# Patient Record
Sex: MALE | Race: WHITE | Employment: FULL TIME | URBAN - NONMETROPOLITAN AREA
[De-identification: names, ages, dates, MRNs, and addresses within clinical notes are randomized per-mention and may not be internally consistent; named-entity substitution may affect disease eponyms.]

---

## 2017-02-17 ENCOUNTER — TELEPHONE (OUTPATIENT)
Dept: UROLOGY | Age: 37
End: 2017-02-17

## 2017-02-17 ENCOUNTER — OFFICE VISIT (OUTPATIENT)
Dept: UROLOGY | Age: 37
End: 2017-02-17

## 2017-02-17 VITALS
HEIGHT: 74 IN | SYSTOLIC BLOOD PRESSURE: 138 MMHG | DIASTOLIC BLOOD PRESSURE: 78 MMHG | WEIGHT: 315 LBS | BODY MASS INDEX: 40.43 KG/M2

## 2017-02-17 DIAGNOSIS — Z99.89 OBSTRUCTIVE SLEEP APNEA ON CPAP: ICD-10-CM

## 2017-02-17 DIAGNOSIS — I10 ESSENTIAL HYPERTENSION: ICD-10-CM

## 2017-02-17 DIAGNOSIS — Z01.818 PRE-OP TESTING: ICD-10-CM

## 2017-02-17 DIAGNOSIS — G47.33 OBSTRUCTIVE SLEEP APNEA ON CPAP: ICD-10-CM

## 2017-02-17 DIAGNOSIS — N50.89 TESTICULAR MASS: Primary | ICD-10-CM

## 2017-02-17 PROCEDURE — 99214 OFFICE O/P EST MOD 30 MIN: CPT | Performed by: UROLOGY

## 2017-02-21 ENCOUNTER — TELEPHONE (OUTPATIENT)
Dept: UROLOGY | Age: 37
End: 2017-02-21

## 2017-02-22 ENCOUNTER — TELEPHONE (OUTPATIENT)
Dept: UROLOGY | Age: 37
End: 2017-02-22

## 2017-02-23 LAB
BASOPHILS ABSOLUTE: NORMAL /ΜL
BASOPHILS RELATIVE PERCENT: 0.1 %
BUN BLDV-MCNC: 18 MG/DL
CALCIUM SERPL-MCNC: 9.5 MG/DL
CHLORIDE BLD-SCNC: 105 MMOL/L
CO2: 30 MMOL/L
CREAT SERPL-MCNC: 1 MG/DL
EOSINOPHILS ABSOLUTE: NORMAL /ΜL
EOSINOPHILS RELATIVE PERCENT: 1.9 %
GFR CALCULATED: NORMAL
GLUCOSE BLD-MCNC: 97 MG/DL
HCT VFR BLD CALC: 49.8 % (ref 41–53)
HEMOGLOBIN: 16.7 G/DL (ref 13.5–17.5)
LYMPHOCYTES ABSOLUTE: NORMAL /ΜL
LYMPHOCYTES RELATIVE PERCENT: 34.5 %
MCH RBC QN AUTO: 29.1 PG
MCHC RBC AUTO-ENTMCNC: 33.5 G/DL
MCV RBC AUTO: 86.9 FL
MONOCYTES ABSOLUTE: NORMAL /ΜL
MONOCYTES RELATIVE PERCENT: 8.2 %
NEUTROPHILS ABSOLUTE: NORMAL /ΜL
NEUTROPHILS RELATIVE PERCENT: 54.1 %
PDW BLD-RTO: 12.7 %
PLATELET # BLD: 198 K/ΜL
PMV BLD AUTO: NORMAL FL
POTASSIUM SERPL-SCNC: 4.4 MMOL/L
RBC # BLD: 5.73 10^6/ΜL
SODIUM BLD-SCNC: 142 MMOL/L
WBC # BLD: 7.2 10^3/ML

## 2017-03-02 ENCOUNTER — TELEPHONE (OUTPATIENT)
Dept: UROLOGY | Age: 37
End: 2017-03-02

## 2017-03-14 ENCOUNTER — OFFICE VISIT (OUTPATIENT)
Dept: UROLOGY | Age: 37
End: 2017-03-14

## 2017-03-14 VITALS
HEIGHT: 74 IN | SYSTOLIC BLOOD PRESSURE: 136 MMHG | BODY MASS INDEX: 40.43 KG/M2 | WEIGHT: 315 LBS | DIASTOLIC BLOOD PRESSURE: 80 MMHG

## 2017-03-14 DIAGNOSIS — N50.89 MASS OF RIGHT TESTIS: Primary | ICD-10-CM

## 2017-03-14 LAB
BILIRUBIN URINE: NEGATIVE
BLOOD URINE, POC: NORMAL
CHARACTER, URINE: CLEAR
COLOR, URINE: YELLOW
GLUCOSE URINE: NEGATIVE MG/DL
KETONES, URINE: NEGATIVE
LEUKOCYTE CLUMPS, URINE: NEGATIVE
NITRITE, URINE: NEGATIVE
PH, URINE: 5.5
PROTEIN, URINE: NEGATIVE MG/DL
SPECIFIC GRAVITY, URINE: 1.02 (ref 1–1.03)
UROBILINOGEN, URINE: 0.2 EU/DL

## 2017-03-14 PROCEDURE — 99024 POSTOP FOLLOW-UP VISIT: CPT | Performed by: UROLOGY

## 2017-03-14 PROCEDURE — 81003 URINALYSIS AUTO W/O SCOPE: CPT | Performed by: UROLOGY

## 2017-06-09 ENCOUNTER — OFFICE VISIT (OUTPATIENT)
Dept: PULMONOLOGY | Age: 37
End: 2017-06-09

## 2017-06-09 VITALS
OXYGEN SATURATION: 97 % | SYSTOLIC BLOOD PRESSURE: 110 MMHG | WEIGHT: 315 LBS | DIASTOLIC BLOOD PRESSURE: 74 MMHG | HEART RATE: 58 BPM | HEIGHT: 74 IN | BODY MASS INDEX: 40.43 KG/M2

## 2017-06-09 DIAGNOSIS — Z99.89 OSA ON CPAP: Primary | ICD-10-CM

## 2017-06-09 DIAGNOSIS — G47.33 OSA ON CPAP: Primary | ICD-10-CM

## 2017-06-09 PROCEDURE — 99213 OFFICE O/P EST LOW 20 MIN: CPT | Performed by: INTERNAL MEDICINE

## 2018-02-16 ENCOUNTER — HOSPITAL ENCOUNTER (OUTPATIENT)
Dept: NON INVASIVE DIAGNOSTICS | Age: 38
Discharge: HOME | End: 2018-02-16
Payer: COMMERCIAL

## 2018-02-16 VITALS — WEIGHT: 315 LBS | HEIGHT: 74 IN | BODY MASS INDEX: 40.43 KG/M2

## 2018-02-16 PROCEDURE — 3430000000 HC RX DIAGNOSTIC RADIOPHARMACEUTICAL: Performed by: FAMILY MEDICINE

## 2018-02-16 PROCEDURE — 93017 CV STRESS TEST TRACING ONLY: CPT | Performed by: NUCLEAR MEDICINE

## 2018-02-16 PROCEDURE — 78452 HT MUSCLE IMAGE SPECT MULT: CPT

## 2018-02-16 PROCEDURE — A9500 TC99M SESTAMIBI: HCPCS | Performed by: FAMILY MEDICINE

## 2018-02-16 PROCEDURE — 6360000002 HC RX W HCPCS

## 2018-02-16 RX ADMIN — Medication 33 MILLICURIE: at 13:45

## 2018-02-16 RX ADMIN — Medication 9.3 MILLICURIE: at 12:38

## 2018-03-16 ENCOUNTER — OFFICE VISIT (OUTPATIENT)
Dept: UROLOGY | Age: 38
End: 2018-03-16
Payer: COMMERCIAL

## 2018-03-16 VITALS
BODY MASS INDEX: 40.43 KG/M2 | DIASTOLIC BLOOD PRESSURE: 82 MMHG | WEIGHT: 315 LBS | HEIGHT: 74 IN | SYSTOLIC BLOOD PRESSURE: 144 MMHG

## 2018-03-16 DIAGNOSIS — Z90.79 H/O UNILATERAL ORCHIECTOMY: Primary | ICD-10-CM

## 2018-03-16 DIAGNOSIS — N52.9 ERECTILE DYSFUNCTION, UNSPECIFIED ERECTILE DYSFUNCTION TYPE: ICD-10-CM

## 2018-03-16 LAB
BILIRUBIN URINE: NEGATIVE
BLOOD URINE, POC: NORMAL
CHARACTER, URINE: CLEAR
COLOR, URINE: YELLOW
GLUCOSE URINE: NEGATIVE MG/DL
KETONES, URINE: NEGATIVE
LEUKOCYTE CLUMPS, URINE: NEGATIVE
NITRITE, URINE: NEGATIVE
PH, URINE: 5.5
PROTEIN, URINE: NEGATIVE MG/DL
SPECIFIC GRAVITY, URINE: 1.02 (ref 1–1.03)
UROBILINOGEN, URINE: 1 EU/DL

## 2018-03-16 PROCEDURE — G8484 FLU IMMUNIZE NO ADMIN: HCPCS | Performed by: NURSE PRACTITIONER

## 2018-03-16 PROCEDURE — G8427 DOCREV CUR MEDS BY ELIG CLIN: HCPCS | Performed by: NURSE PRACTITIONER

## 2018-03-16 PROCEDURE — 99213 OFFICE O/P EST LOW 20 MIN: CPT | Performed by: NURSE PRACTITIONER

## 2018-03-16 PROCEDURE — 1036F TOBACCO NON-USER: CPT | Performed by: NURSE PRACTITIONER

## 2018-03-16 PROCEDURE — 81003 URINALYSIS AUTO W/O SCOPE: CPT | Performed by: NURSE PRACTITIONER

## 2018-03-16 PROCEDURE — G8417 CALC BMI ABV UP PARAM F/U: HCPCS | Performed by: NURSE PRACTITIONER

## 2018-03-16 NOTE — PROGRESS NOTES
Weight: (!) 405 lb 9.6 oz (184 kg)    Height: 6' 2\" (1.88 m)        Constitutional: Alert and oriented times 3, no acute distress and cooperative to examination with appropriate mood and affect. HENT:   Head:        Normocephalic and atraumatic. Mouth/Throat:         Mucous membranes are normal.   Eyes:         EOM are normal. No scleral icterus. PERRLA. Neck:        Supple, symmetrical, trachea midline  Pulmonary/Chest:      Chest symmetric with normal A/P diameter. Normal respiratory rate and rhthym. No use of accessory muscles. Abdominal:         Soft. No tenderness. Bowel sounds present. Obese  Musculoskeletal:         Normal range of motion. No edema or tenderness of lower extremities. Extremities: No cyanosis, clubbing, or edema present. Neurological:        Alert and oriented. No cranial nerve deficit. Benjamen Lies Psychiatric:        Normal mood and affect. Genitalia: Uncircumcised penis, urethral meatus in normal location, foreskin easily retracts over glands. Surgically absent right testicle, left testicle appears benign.       Labs   Urine dip demonstrates   Results for POC orders placed in visit on 03/16/18   POCT Urinalysis No Micro (Auto)   Result Value Ref Range    Glucose, Ur Negative NEGATIVE mg/dl    Bilirubin Urine Negative     Ketones, Urine Negative NEGATIVE    Specific Gravity, Urine 1.025 1.002 - 1.03    Blood, UA POC Trace-intact NEGATIVE    pH, Urine 5.50 5.0 - 9.0    Protein, Urine Negative NEGATIVE mg/dl    Urobilinogen, Urine 1.00 0.0 - 1.0 eu/dl    Nitrite, Urine Negative NEGATIVE    Leukocyte Clumps, Urine Negative NEGATIVE    Color, Urine Yellow YELLOW-STR    Character, Urine Clear [id number]       Patients recent PSA values are as follows  No results found for: PSA, PSADIA     Recent BUN/Creatinine:  Lab Results   Component Value Date    BUN 18 02/23/2017    CREATININE 1.0 02/23/2017       Radiology  No recent imaging       Assessment & Plan:     Hx of right orchiectomy Erectile Dysfunction    Kashif Todd follows up history of right orchiectomy. Scrotal exam is benign on exam. Takes Viagra 50 mg PRN for ED, obtaining medication with PCP. Advised weight loss, staying physically active and eating healthy to help prevent/improve ED function. He will follow up with us PRN in the future. Return if symptoms worsen or fail to improve.     ALEXIA Schneider  Urology

## 2018-07-20 ENCOUNTER — OFFICE VISIT (OUTPATIENT)
Dept: PULMONOLOGY | Age: 38
End: 2018-07-20
Payer: COMMERCIAL

## 2018-07-20 VITALS
HEIGHT: 74 IN | HEART RATE: 86 BPM | DIASTOLIC BLOOD PRESSURE: 86 MMHG | WEIGHT: 315 LBS | OXYGEN SATURATION: 95 % | SYSTOLIC BLOOD PRESSURE: 134 MMHG | BODY MASS INDEX: 40.43 KG/M2 | RESPIRATION RATE: 18 BRPM

## 2018-07-20 DIAGNOSIS — G47.33 OBSTRUCTIVE SLEEP APNEA ON CPAP: Primary | ICD-10-CM

## 2018-07-20 DIAGNOSIS — Z99.89 OBSTRUCTIVE SLEEP APNEA ON CPAP: Primary | ICD-10-CM

## 2018-07-20 PROCEDURE — 1036F TOBACCO NON-USER: CPT | Performed by: PHYSICIAN ASSISTANT

## 2018-07-20 PROCEDURE — G8417 CALC BMI ABV UP PARAM F/U: HCPCS | Performed by: PHYSICIAN ASSISTANT

## 2018-07-20 PROCEDURE — G8427 DOCREV CUR MEDS BY ELIG CLIN: HCPCS | Performed by: PHYSICIAN ASSISTANT

## 2018-07-20 PROCEDURE — 99213 OFFICE O/P EST LOW 20 MIN: CPT | Performed by: PHYSICIAN ASSISTANT

## 2018-07-20 ASSESSMENT — ENCOUNTER SYMPTOMS
RESPIRATORY NEGATIVE: 1
EYES NEGATIVE: 1
NAUSEA: 0
SORE THROAT: 0
ORTHOPNEA: 0
SHORTNESS OF BREATH: 0
SPUTUM PRODUCTION: 0
HEARTBURN: 0
SINUS PAIN: 0
COUGH: 0
WHEEZING: 0
GASTROINTESTINAL NEGATIVE: 1

## 2018-07-20 NOTE — PROGRESS NOTES
[facility]      Earline Andrade                                         [id number]  7/20/2018   Chief Complaint   Patient presents with    Sleep Apnea     ERICH 1 yr f/u with download        Pt of Dr. Brinda Jaramillo    PAP Download:   Original or initial  AHI: 89.0    Date of initial study: 5-27-10    [x] Compliant  96.7%   []  Noncompliant 3.3 %     PAP Type C PAP   Level  11.0 cmH2O  Avg Hrs/Day 6 hrs 46 min 45 sec  AHI: 0.7   Recorded compliance dates , 6-20-18  to 7-19-18  Machine/Mfg: Respironics Interface: Nasal pillows    Provider:  [x][facility]  [][facility]  [][facility]  [][facility]         [][facility] []Other:   Neck Size:  22 in  Mallampati: IV  ESS:  1  Here is a scan of the most recent download:              Presentation:   Lloyd Lyn presents for sleep medicine follow up for obstructive sleep apnea  Since the last visit, Lloyd Lyn is doing reasonably well with their sleep machine. Other comments: He is doing well with PAP. Equipment issues: The pressure is  acceptable, the mask is acceptable and he  is  using the humidity. Sleep issues:  Do you feel better? Yes  More rested? Yes   Better concentration? yes    Progress History:   Since last visit any new medical issues? No  New ER or hospitlal visits? No  Any new or changes in medicines? No  Any new sleep medicines?  No        Past Medical History:   Diagnosis Date    Chronic gout 2004    Hypertension     Sleep apnea 2011    has CPAP       Past Surgical History:   Procedure Laterality Date    APPENDECTOMY  2001    TESTICLE REMOVAL Right 03/06/2017    Dr. Sandra Maria       Social History   Substance Use Topics    Smoking status: Former Smoker     Quit date: 2002    Smokeless tobacco: Never Used    Alcohol use 0.0 oz/week      Comment: social       No Known Allergies    Current Outpatient Prescriptions   Medication Sig Dispense Refill    allopurinol (ZYLOPRIM) 300 MG tablet Take 300 mg by mouth 2 times daily       motion. Neck supple. Cardiovascular: Normal rate, regular rhythm and normal heart sounds. Pulmonary/Chest: Effort normal and breath sounds normal. No stridor. No respiratory distress. Abdominal: Soft. Bowel sounds are normal.   Musculoskeletal: Normal range of motion. He exhibits no edema. Neurological: He is alert and oriented to person, place, and time. Gait normal.   Skin: Skin is warm and dry. He is not diaphoretic. Psychiatric: Mood, memory, affect and judgment normal.         ASSESSMENT/DIAGNOSIS     Diagnosis Orders   1. Obstructive sleep apnea on CPAP              Plan   Do you need any equipment today? Yes update supplies    - He  was advised to continue current positive airway pressure therapy with above described pressure. - He  advised to keep good compliance with current recommended pressure to get optimal results and clinical improvement  - Recommend 7-9 hours of sleep with PAP  - He was advised to call [facility] regarding supplies if needed. - He call my office for earlier appointment if needed for worsening of sleep symptoms.   - He was instructed on weight loss  - Darylene Rancher was educated about my impression and plan. Patient verbalizes understanding.   We will see Elias Rey back in: 1 year with download    Agustin Garcia PA-C, MPAS  7/20/2018

## 2019-07-22 ENCOUNTER — OFFICE VISIT (OUTPATIENT)
Dept: PULMONOLOGY | Age: 39
End: 2019-07-22
Payer: COMMERCIAL

## 2019-07-22 VITALS
SYSTOLIC BLOOD PRESSURE: 124 MMHG | DIASTOLIC BLOOD PRESSURE: 70 MMHG | HEART RATE: 58 BPM | BODY MASS INDEX: 40.43 KG/M2 | HEIGHT: 74 IN | OXYGEN SATURATION: 98 % | WEIGHT: 315 LBS

## 2019-07-22 DIAGNOSIS — Z99.89 OBSTRUCTIVE SLEEP APNEA ON CPAP: Primary | ICD-10-CM

## 2019-07-22 DIAGNOSIS — G47.33 OBSTRUCTIVE SLEEP APNEA ON CPAP: Primary | ICD-10-CM

## 2019-07-22 DIAGNOSIS — E66.01 OBESITY, CLASS III, BMI 40-49.9 (MORBID OBESITY) (HCC): ICD-10-CM

## 2019-07-22 PROCEDURE — G8427 DOCREV CUR MEDS BY ELIG CLIN: HCPCS | Performed by: PHYSICIAN ASSISTANT

## 2019-07-22 PROCEDURE — G8417 CALC BMI ABV UP PARAM F/U: HCPCS | Performed by: PHYSICIAN ASSISTANT

## 2019-07-22 PROCEDURE — 1036F TOBACCO NON-USER: CPT | Performed by: PHYSICIAN ASSISTANT

## 2019-07-22 PROCEDURE — 99213 OFFICE O/P EST LOW 20 MIN: CPT | Performed by: PHYSICIAN ASSISTANT

## 2019-07-22 ASSESSMENT — ENCOUNTER SYMPTOMS
BACK PAIN: 0
CHEST TIGHTNESS: 0
SHORTNESS OF BREATH: 0
EYES NEGATIVE: 1
NAUSEA: 0
ALLERGIC/IMMUNOLOGIC NEGATIVE: 1
DIARRHEA: 0
WHEEZING: 0
COUGH: 0
STRIDOR: 0

## 2020-06-15 ENCOUNTER — OFFICE VISIT (OUTPATIENT)
Dept: UROLOGY | Age: 40
End: 2020-06-15
Payer: COMMERCIAL

## 2020-06-15 VITALS — WEIGHT: 315 LBS | HEIGHT: 74 IN | BODY MASS INDEX: 40.43 KG/M2 | TEMPERATURE: 98.6 F

## 2020-06-15 LAB
BILIRUBIN URINE: NEGATIVE
BLOOD URINE, POC: ABNORMAL
CHARACTER, URINE: CLEAR
COLOR, URINE: YELLOW
GLUCOSE URINE: NEGATIVE MG/DL
KETONES, URINE: NEGATIVE
LEUKOCYTE CLUMPS, URINE: NEGATIVE
NITRITE, URINE: NEGATIVE
PH, URINE: 5.5 (ref 5–9)
PROTEIN, URINE: NEGATIVE MG/DL
SPECIFIC GRAVITY, URINE: 1.02 (ref 1–1.03)
UROBILINOGEN, URINE: 0.2 EU/DL (ref 0–1)

## 2020-06-15 PROCEDURE — G8427 DOCREV CUR MEDS BY ELIG CLIN: HCPCS | Performed by: UROLOGY

## 2020-06-15 PROCEDURE — G8417 CALC BMI ABV UP PARAM F/U: HCPCS | Performed by: UROLOGY

## 2020-06-15 PROCEDURE — 1036F TOBACCO NON-USER: CPT | Performed by: UROLOGY

## 2020-06-15 PROCEDURE — 99204 OFFICE O/P NEW MOD 45 MIN: CPT | Performed by: UROLOGY

## 2020-06-15 PROCEDURE — 81003 URINALYSIS AUTO W/O SCOPE: CPT | Performed by: UROLOGY

## 2020-06-15 RX ORDER — TADALAFIL 20 MG/1
20 TABLET ORAL PRN
Qty: 10 TABLET | Refills: 11 | Status: SHIPPED | OUTPATIENT
Start: 2020-06-15 | End: 2020-09-14

## 2020-06-15 NOTE — PROGRESS NOTES
patric   Urology Clinic Consultation / New Patient Visit      Patient:  Patricia Hayden  YOB: 1980  Date: 6/15/2020    HISTORY OF PRESENT ILLNESS:   The patient is a 35 y.o. male who presents today for evaluation of the following problem(s):      1. Scrotal pain    2. Epididymoorchitis    3. Erectile dysfunction due to arterial insufficiency    4. Low libido         C/o scrotal pain and erectile dysfunction. Overall the problem(s) : are worsening. Associated Symptoms: No dysuria, gross hematuria. Pain Severity:   3/10    Summary of old records: Testicle removal, right, 2017. No hx of hernias, kidney stones. Is a . (Patient's old records, notes and chart reviewed and summarized above.)      Onset 1 week ago  Severity is described as mild-moderate. The course of symptoms over time is resolved and intermittent. Alleviating factors: none  Worsening factors: none  Lower urinary tract symptoms: None  No fevers, chills, n/v, abdominal pain, diarrhea, constipation  Pinching discomfort, intermittent  Resolved  No heamturia    ED: present for about 5 years, worse in the last year. No interest, nocturnal erections. No recent stressors in life. No sexual activity x 1 year. Difficulty with maintaining erection, no issues with getting erection. Has tried viagra, has had times where it didn't work at all for him. Intermittent use.  Testosterone 12/3/2019, 3.84, normal.          Last several PSA's:  No results found for: PSA    Last total testosterone:  Lab Results   Component Value Date    TESTOSTERONE 3.84 12/03/2019       Urinalysis today:  Results for POC orders placed in visit on 06/15/20   POCT Urinalysis No Micro (Auto)   Result Value Ref Range    Glucose, Ur Negative NEGATIVE mg/dl    Bilirubin Urine Negative     Ketones, Urine Negative NEGATIVE    Specific Gravity, Urine 1.020 1.002 - 1.03    Blood, UA POC Small (A) NEGATIVE    pH, Urine 5.50 5.0 - 9.0    Protein, Urine Negative NEGATIVE negative except for what is in HPI  Skin: negative   Neurological: negative  Hematological/Lymphatic: negative  Psychological: negative    Physical Exam:    This a 35 y.o. male   Vitals:    06/15/20 1209   Temp: 98.6 °F (37 °C)     Constitutional: Patient in no acute distress; Neuro: alert and oriented to person place and time. Psych: Mood and affect normal.  Skin: Normal  Lungs: Respiratory effort normal  Cardiovascular:  Normal peripheral pulses  Abdomen: Soft, non-tender, non-distended   Bladder non-tender and not distended. Lymphatics: no palpable lymphadenopathy  Penis normal and circumcised  Urethral meatus normal  Scrotal exam normal  Right testicle absent  Left testicle normal no masses  No tenderness    Assessment and Plan      1. Scrotal pain    2. Epididymoorchitis    3. Erectile dysfunction due to arterial insufficiency    4. Low libido           Plan:         ED: tried Sildenafil in the past, 50 mg. Sometimes works or sometimes not. Recheck Labs (free and total T)  Will try Cialis 20mg PRN  Good Rx coupon  Patient told to take  least 1 hour prior to sexual intercourse. He was told he cannot take this medication with nitrates such as nitroglycerin. The patient was told Viagra will help him for 4-6 hours. EO-itis: resolved. Discussed avoiding triggers. Return in about 6 weeks (around 7/27/2020). Checo Hampton MD  [facility] U[facility]    I have discussed the care of this patient including pertinent history and exam findings, with the resident. I have seen and examined the patient and the key elements of all parts of the encounter have been performed by me. I agree with the assessment, plan and orders as documented by the resident.     Lori Hernandez M.D, MD

## 2020-07-20 ENCOUNTER — NURSE ONLY (OUTPATIENT)
Dept: LAB | Age: 40
End: 2020-07-20

## 2020-07-22 LAB — TESTOSTERONE FREE: NORMAL

## 2020-08-10 ENCOUNTER — OFFICE VISIT (OUTPATIENT)
Dept: UROLOGY | Age: 40
End: 2020-08-10
Payer: COMMERCIAL

## 2020-08-10 ENCOUNTER — TELEPHONE (OUTPATIENT)
Dept: UROLOGY | Age: 40
End: 2020-08-10

## 2020-08-10 VITALS — BODY MASS INDEX: 40.43 KG/M2 | HEIGHT: 74 IN | TEMPERATURE: 94.6 F | WEIGHT: 315 LBS

## 2020-08-10 LAB
BILIRUBIN URINE: NEGATIVE
BLOOD URINE, POC: NORMAL
CHARACTER, URINE: CLEAR
COLOR, URINE: YELLOW
GLUCOSE URINE: NEGATIVE MG/DL
KETONES, URINE: NEGATIVE
LEUKOCYTE CLUMPS, URINE: NEGATIVE
NITRITE, URINE: NEGATIVE
PH, URINE: 5.5 (ref 5–9)
PROTEIN, URINE: NEGATIVE MG/DL
SPECIFIC GRAVITY, URINE: 1.02 (ref 1–1.03)
UROBILINOGEN, URINE: 0.2 EU/DL (ref 0–1)

## 2020-08-10 PROCEDURE — G8417 CALC BMI ABV UP PARAM F/U: HCPCS | Performed by: UROLOGY

## 2020-08-10 PROCEDURE — 1036F TOBACCO NON-USER: CPT | Performed by: UROLOGY

## 2020-08-10 PROCEDURE — 81003 URINALYSIS AUTO W/O SCOPE: CPT | Performed by: UROLOGY

## 2020-08-10 PROCEDURE — G8427 DOCREV CUR MEDS BY ELIG CLIN: HCPCS | Performed by: UROLOGY

## 2020-08-10 PROCEDURE — 99214 OFFICE O/P EST MOD 30 MIN: CPT | Performed by: UROLOGY

## 2020-09-14 ENCOUNTER — OFFICE VISIT (OUTPATIENT)
Dept: UROLOGY | Age: 40
End: 2020-09-14
Payer: COMMERCIAL

## 2020-09-14 ENCOUNTER — TELEPHONE (OUTPATIENT)
Dept: UROLOGY | Age: 40
End: 2020-09-14

## 2020-09-14 VITALS — TEMPERATURE: 97.7 F | WEIGHT: 315 LBS | BODY MASS INDEX: 40.43 KG/M2 | HEIGHT: 74 IN

## 2020-09-14 PROCEDURE — G8417 CALC BMI ABV UP PARAM F/U: HCPCS | Performed by: UROLOGY

## 2020-09-14 PROCEDURE — 99214 OFFICE O/P EST MOD 30 MIN: CPT | Performed by: UROLOGY

## 2020-09-14 PROCEDURE — G8427 DOCREV CUR MEDS BY ELIG CLIN: HCPCS | Performed by: UROLOGY

## 2020-09-14 PROCEDURE — 1036F TOBACCO NON-USER: CPT | Performed by: UROLOGY

## 2020-09-14 NOTE — PROGRESS NOTES
Patient is having a pinching feeling on right side of scrotum( side testicle was removed from 4 years ago). Noticing almost like a \" fluid filled cyst\" per patient and wife.

## 2020-09-14 NOTE — TELEPHONE ENCOUNTER
Patient to be scheduled for surgery with Dr Joao Eduardo. Surgery consent on arrival. Patient will need pre op fasting labs,urinalysis and Covid 19 prior. Surgery instructions to be mailed to the patient.

## 2020-09-14 NOTE — PROGRESS NOTES
Dr. Nicolasa Crowley MD  [facility] Urology Clinic Consultation / New Patient Visit    Patient:  Cris Chandler  YOB: 1980  Date: 9/14/2020  Consult requested from Jamshid Bowman MD     HISTORY OF PRESENT ILLNESS:   The patient is a 35 y.o. male who presents today for follow-up for the following problem(s): ED, low libido  Overall the problem(s) : are worsening. Associated Symptoms: No dysuria, gross hematuria. Pain Severity:      Today visit:   9/14/2020  Today we perform trimix teaching. He is here with his wife. We explain anatomy and risks (including risk of priapism), benefits and alternative procedures are explained, informed consent is obtained, and the patient elects to proceed. At the 3 O'clock position we inject 0.1ml of solution into the corporeal body. Pressure was held, there was no bruising or bleeding. He also has a small scrotal cyst in the upper right scrotum. There is a lesion associated with it. There is purulent drainage, but no obvious signs of infection. 8/10/20   Thanh Culver presents with us today for follow up for ED. He has tried viagra in the past, and recently was switched to Cialis. He can achieve an erection, but the quality is no sufficient for intercourse. He states he and his wife are getting frusterated. Would like to try another modality. We discuss next option, and he would like to try ICI. Discussed risks and benefits, including risk of priapism. Testosterone levels are reviewed and wnl. Dicussed weight loss. Path: Right orchiectomy  FINAL DIAGNOSIS:   Right testis with spermatic cord, orchiectomy:    Benign epithelial lined cysts consistent with epididymal origin.    Benign testicular parenchyma and unremarkable spermatic cord.    Negative for malignancy.      Summary of old records:   (Patient's old records, notes and chart reviewed and summarized above.)    Last several PSA's:  No results found for: PSA    Last total testosterone:  Lab Results   Component Value Date    TESTOSTERONE 3.84 2019       Urinalysis today:  No results found for this visit on 20. Last BUN and creatinine:  Lab Results   Component Value Date    BUN 14 2019     Lab Results   Component Value Date    CREATININE 0.95 2019       Imaging Reviewed during this Office Visit:   (results were independently reviewed by physician and radiology report verified)    PAST MEDICAL, FAMILY AND SOCIAL HISTORY:  Past Medical History:   Diagnosis Date    Chronic gout     Hypertension     Sleep apnea 2011    has CPAP     Past Surgical History:   Procedure Laterality Date    APPENDECTOMY  2001    TESTICLE REMOVAL Right 2017    Dr. David Gomez     Family History   Problem Relation Age of Onset    High Blood Pressure Mother     Cancer Maternal Grandmother     Stroke Maternal Grandmother     Diabetes Neg Hx     Heart Disease Neg Hx      Outpatient Medications Marked as Taking for the 20 encounter (Office Visit) with Khris Butler MD   Medication Sig Dispense Refill    allopurinol (ZYLOPRIM) 300 MG tablet Take 300 mg by mouth 2 times daily       lisinopril (PRINIVIL;ZESTRIL) 20 MG tablet Take 20 mg by mouth daily. Patient has no known allergies.   Social History     Tobacco Use   Smoking Status Former Smoker    Last attempt to quit: 2002    Years since quittin.7   Smokeless Tobacco Never Used       Social History     Substance and Sexual Activity   Alcohol Use Yes    Alcohol/week: 0.0 standard drinks    Comment: social       REVIEW OF SYSTEMS:  Constitutional: negative  Eyes: negative  Respiratory: negative  Cardiovascular: negative  Gastrointestinal: negative  Musculoskeletal: negative  Genitourinary: negative  Skin: negative   Neurological: negative  Hematological/Lymphatic: negative  Psychological: negative    Physical Exam:    This a 35 y.o. male   Vitals:    20 1136   Temp: 97.7 °F (36.5 °C) Constitutional: Patient in no acute distress   Neuro: alert and oriented to person place and time. Psych: Mood and affect normal.  Head: atraumatic normocephalic  Eyes: EOMi  HEENT: neck supple, trachea midline  Lungs: Respiratory effort normal  Cardiovascular:  Normal peripheral pulses  Abdomen: Soft, non-tender, non-distended, No CVA  Bladder: non-tender and not distended. FROMx4, no cyanosis clubbing edema  Skin: warm and dry      Assessment and Plan      1. Epididymoorchitis    2. Combined arterial insufficiency and corporo-venous occlusive erectile dysfunction    3. Low libido    4. Scrotal lesion           Plan:      No follow-ups on file.   Trimix injected without complication  Wife present for teaching  Excision of scrotal lesion

## 2020-09-16 NOTE — TELEPHONE ENCOUNTER
Patient's voice mail was full so I called his wife listed on [employer]. She will discuss with the patient and call me back. They are thinking of holding off on the procedure for a couple of months.

## 2020-09-21 ENCOUNTER — TELEPHONE (OUTPATIENT)
Dept: UROLOGY | Age: 40
End: 2020-09-21

## 2020-09-21 NOTE — TELEPHONE ENCOUNTER
Patient said that he was calling back to let Dr. Keyana Chiu know that the trimex is working for him.   DOLV 09/14/2020 DONV 10/12/2020  Please advise  [phone]

## 2020-09-24 NOTE — TELEPHONE ENCOUNTER
Dr. Ai Avery message back to me from [employer], Yes thank you. I will fill out new trimix form. Faxed to [facility] will await confirmation.

## 2020-10-09 ENCOUNTER — TELEPHONE (OUTPATIENT)
Dept: PULMONOLOGY | Age: 40
End: 2020-10-09

## 2020-10-12 NOTE — TELEPHONE ENCOUNTER
SURGERY [address]  [address] [place] [address] [address] [facility] [address] NATALIA EUGENE AM OFFENEGG II.NICOLETTE, [address]      Phone *[phone] *[phone]   Surgical Scheduling Direct Line Phone *[phone] Fax *[phone]      Grant Edwardo 1980 male    [address] [place] [address]   Marital Status:          Home Phone: [phone]      Cell Phone:    Telephone Information:   Mobile [phone]   Mobile [phone]          Surgeon: Dr. Sofy Naranjo  Surgery Date: 12/18/20   Time: 12:00 Noon    Procedure: Excision of scrotal lesion    Diagnosis: Scrotal lesion     Important Medical History: In epic    Special Inst/Equip: Regular Room    CPT Codes:    74841  Latex Allergy:  No     Cardiac Device:  No     Anesthesia:  MAC          Admission Type:  Same Day                             Admit Prior to Day of Surgery: No    Case Location:  Main OR           Preadmission Testing:Phone Call              PAT Date and Time:______________________________________________________    PAT Confirmation #: ______________________________________________________    Post Op Visit: ___________________________________________________________    Need Preop Cardiac Clearance: No    Does Patient have Cardiologist/physician?      None    Surgery Confirmation #: __________________________________________________    : ________________________   Date: __________________________     [employer] Name: [facility] [place]

## 2020-10-12 NOTE — TELEPHONE ENCOUNTER
DO NOT TAKE ASPIRIN, PLAVIX, FISH OIL, COUMADIN, IBUPROFEN, MOTRIN-LIKE DRUGS AND ANY MULTIVITAMINS OR OVER THE COUNTER SUPPLEMENTS 5 DAYS PRIOR TO SURGERY AND 3 DAYS FOLLOWING     Hubert Vaughn 1980 Diagnosis:     Surgical Physician: Dr. Rafy Jackson have been scheduled for the procedure marked below:      Surgery: Excision of scrotal lesion           Date: 12/18/20    Anesthesia: MAC     Place of Service: [facility] Second Floor Same Day Surgery           Arrive to same day surgery by:  10:00 am  (Surgery time is subject to change)        INSTRUCTIONS AS MARKED BELOW:    1.   DO NOT eat or drink anything after midnight before surgery. 2.   We prefer you shower or bathe with an antibacterial soap (Dial) the morning of surgery. 3.   Please ensure to have a  with you to transport you home. 4.   Please bring a current medication list, photo ID and insurance card(s) with you  5. Okay to take Tylenol  6. If you take Glucophage, Metformin or Janumet, hold 48-hours prior to surgery  7. Take blood pressure or heart medication as directed, if taken in the morning take with a small sip of water  8. Please do the pre op fasting labs, urinalysis and EKG on 12/8/20. These are date specific. Orders included. 9.   Please do the Covid-19 test on 12/11/20. This is date specific so results are back or the surgery can be cancelled. 10. The office will call you in 1-2 days after your procedure to schedule a follow up.       (Covid-19 screening is date sensitive and can only be done 5 to 7 days prior to your procedure)    Date: 10/12/2020

## 2020-10-14 ENCOUNTER — TELEPHONE (OUTPATIENT)
Dept: UROLOGY | Age: 40
End: 2020-10-14

## 2020-11-18 ENCOUNTER — OFFICE VISIT (OUTPATIENT)
Dept: UROLOGY | Age: 40
End: 2020-11-18
Payer: COMMERCIAL

## 2020-11-18 ENCOUNTER — TELEPHONE (OUTPATIENT)
Dept: UROLOGY | Age: 40
End: 2020-11-18

## 2020-11-18 VITALS — BODY MASS INDEX: 40.43 KG/M2 | WEIGHT: 315 LBS | HEIGHT: 74 IN | TEMPERATURE: 97.5 F

## 2020-11-18 PROCEDURE — 1036F TOBACCO NON-USER: CPT | Performed by: NURSE PRACTITIONER

## 2020-11-18 PROCEDURE — G8417 CALC BMI ABV UP PARAM F/U: HCPCS | Performed by: NURSE PRACTITIONER

## 2020-11-18 PROCEDURE — G8427 DOCREV CUR MEDS BY ELIG CLIN: HCPCS | Performed by: NURSE PRACTITIONER

## 2020-11-18 PROCEDURE — 99213 OFFICE O/P EST LOW 20 MIN: CPT | Performed by: NURSE PRACTITIONER

## 2020-11-18 PROCEDURE — G8484 FLU IMMUNIZE NO ADMIN: HCPCS | Performed by: NURSE PRACTITIONER

## 2020-11-18 NOTE — TELEPHONE ENCOUNTER
Let pt know he can get covid testing and labs done at the [facility] in [facility]. ekg may have to be done at different place or in [place].    Cancel appt 12/14 with Dr Aidan Grimes

## 2020-11-18 NOTE — PROGRESS NOTES
Patient:  Jackie Chaudhry  YOB: 1980  Date: 11/18/2020  Consult requested from Yannick Allred MD     HISTORY OF PRESENT ILLNESS:   The patient is a 38 y.o. male who presents today for follow-up for the following problem(s): suture coming from scrotum  Overall the problem(s) : are worsening. Associated Symptoms: No dysuria, gross hematuria. Pain Severity:      Today visit:     11-   Pt concerned about suture coming possibly from previous right orchiectomy in 2017. No active bleeding or infection. No pain. Scheduled for excision of scrotal lesion Dec 2020 with Dr. Michelle Wells. Discussed with Dr Michelle Wells and Dr Yanelis Camp, ok to keep surgery, will assess suture at that time. Trimix is going well. No side effects. 9/14/2020  Today we perform trimix teaching. He is here with his wife. We explain anatomy and risks (including risk of priapism), benefits and alternative procedures are explained, informed consent is obtained, and the patient elects to proceed. At the 3 O'clock position we inject 0.1 ml of solution into the corporeal body. Pressure was held, there was no bruising or bleeding. He also has a small scrotal cyst in the upper right scrotum. There is a lesion associated with it. There is purulent drainage, but no obvious signs of infection. 8/10/20   David Jara presents with us today for follow up for ED. He has tried viagra in the past, and recently was switched to Cialis. He can achieve an erection, but the quality is no sufficient for intercourse. He states he and his wife are getting frusterated. Would like to try another modality. We discuss next option, and he would like to try ICI. Discussed risks and benefits, including risk of priapism. Testosterone levels are reviewed and wnl. Dicussed weight loss.     Path: Right orchiectomy  FINAL DIAGNOSIS:   Right testis with spermatic cord, orchiectomy:    Benign epithelial lined cysts consistent with epididymal origin.    Benign testicular parenchyma and unremarkable spermatic cord.    Negative for malignancy. Summary of old records:   (Patient's old records, notes and chart reviewed and summarized above.)    Last several PSA's:  No results found for: PSA    Last total testosterone:  Lab Results   Component Value Date    TESTOSTERONE 3.84 2019       Urinalysis today:  No results found for this visit on 20. Last BUN and creatinine:  Lab Results   Component Value Date    BUN 14 2019     Lab Results   Component Value Date    CREATININE 0.95 2019       Imaging Reviewed during this Office Visit:   (results were independently reviewed by physician and radiology report verified)    PAST MEDICAL, FAMILY AND SOCIAL HISTORY:  Past Medical History:   Diagnosis Date    Chronic gout     Hypertension     Sleep apnea 2011    has CPAP     Past Surgical History:   Procedure Laterality Date    APPENDECTOMY  2001    TESTICLE REMOVAL Right 2017    Dr. Marisol Sanon     Family History   Problem Relation Age of Onset    High Blood Pressure Mother     Cancer Maternal Grandmother     Stroke Maternal Grandmother     Diabetes Neg Hx     Heart Disease Neg Hx      Outpatient Medications Marked as Taking for the 20 encounter (Office Visit) with ALEXIA Kessler CNP   Medication Sig Dispense Refill    allopurinol (ZYLOPRIM) 300 MG tablet Take 300 mg by mouth 2 times daily       lisinopril (PRINIVIL;ZESTRIL) 20 MG tablet Take 20 mg by mouth daily. Patient has no known allergies.   Social History     Tobacco Use   Smoking Status Former Smoker    Last attempt to quit: 2002    Years since quittin.8   Smokeless Tobacco Never Used       Social History     Substance and Sexual Activity   Alcohol Use Yes    Alcohol/week: 0.0 standard drinks    Comment: social       REVIEW OF SYSTEMS:  Constitutional: negative  Eyes: negative  Respiratory: negative  Cardiovascular:

## 2020-11-18 NOTE — TELEPHONE ENCOUNTER
Patient voiced understanding he may go to [address] [facility] lab in [address] [facility] to complete the covid test and labs and he may have to have the EKG done at [facility] [address] at [facility]. Appointment cancelled on 12/14/2020.

## 2020-11-24 ENCOUNTER — PREP FOR PROCEDURE (OUTPATIENT)
Dept: UROLOGY | Age: 40
End: 2020-11-24

## 2020-11-24 RX ORDER — SODIUM CHLORIDE 9 MG/ML
INJECTION, SOLUTION INTRAVENOUS CONTINUOUS
Status: CANCELLED | OUTPATIENT
Start: 2020-12-18

## 2020-12-08 ENCOUNTER — NURSE ONLY (OUTPATIENT)
Dept: LAB | Age: 40
End: 2020-12-08

## 2020-12-08 LAB
ALBUMIN SERPL-MCNC: 4.6 G/DL (ref 3.5–5.1)
ALP BLD-CCNC: 103 U/L (ref 38–126)
ALT SERPL-CCNC: 124 U/L (ref 11–66)
ANION GAP SERPL CALCULATED.3IONS-SCNC: 12 MEQ/L (ref 8–16)
AST SERPL-CCNC: 61 U/L (ref 5–40)
BASOPHILS # BLD: 1 %
BASOPHILS ABSOLUTE: 0.1 THOU/MM3 (ref 0–0.1)
BILIRUB SERPL-MCNC: 0.5 MG/DL (ref 0.3–1.2)
BILIRUBIN URINE: NEGATIVE
BLOOD, URINE: NEGATIVE
BUN BLDV-MCNC: 17 MG/DL (ref 7–22)
CALCIUM SERPL-MCNC: 10 MG/DL (ref 8.5–10.5)
CHARACTER, URINE: CLEAR
CHLORIDE BLD-SCNC: 102 MEQ/L (ref 98–111)
CO2: 26 MEQ/L (ref 23–33)
COLOR: YELLOW
CREAT SERPL-MCNC: 0.8 MG/DL (ref 0.4–1.2)
EOSINOPHIL # BLD: 2.1 %
EOSINOPHILS ABSOLUTE: 0.2 THOU/MM3 (ref 0–0.4)
ERYTHROCYTE [DISTWIDTH] IN BLOOD BY AUTOMATED COUNT: 12.7 % (ref 11.5–14.5)
ERYTHROCYTE [DISTWIDTH] IN BLOOD BY AUTOMATED COUNT: 42 FL (ref 35–45)
GFR SERPL CREATININE-BSD FRML MDRD: > 90 ML/MIN/1.73M2
GLUCOSE BLD-MCNC: 141 MG/DL (ref 70–108)
GLUCOSE URINE: NEGATIVE MG/DL
HCT VFR BLD CALC: 50.5 % (ref 42–52)
HEMOGLOBIN: 17.2 GM/DL (ref 14–18)
IMMATURE GRANS (ABS): 0.02 THOU/MM3 (ref 0–0.07)
IMMATURE GRANULOCYTES: 0.3 %
KETONES, URINE: NEGATIVE
LEUKOCYTE ESTERASE, URINE: NEGATIVE
LYMPHOCYTES # BLD: 32.8 %
LYMPHOCYTES ABSOLUTE: 2.5 THOU/MM3 (ref 1–4.8)
MCH RBC QN AUTO: 30.9 PG (ref 26–33)
MCHC RBC AUTO-ENTMCNC: 34.1 GM/DL (ref 32.2–35.5)
MCV RBC AUTO: 90.7 FL (ref 80–94)
MONOCYTES # BLD: 10.1 %
MONOCYTES ABSOLUTE: 0.8 THOU/MM3 (ref 0.4–1.3)
NITRITE, URINE: NEGATIVE
NUCLEATED RED BLOOD CELLS: 0 /100 WBC
PH UA: 5.5 (ref 5–9)
PLATELET # BLD: 174 THOU/MM3 (ref 130–400)
PMV BLD AUTO: 12.9 FL (ref 9.4–12.4)
POTASSIUM SERPL-SCNC: 4.1 MEQ/L (ref 3.5–5.2)
PROTEIN UA: NEGATIVE
RBC # BLD: 5.57 MILL/MM3 (ref 4.7–6.1)
SEG NEUTROPHILS: 53.7 %
SEGMENTED NEUTROPHILS ABSOLUTE COUNT: 4.1 THOU/MM3 (ref 1.8–7.7)
SODIUM BLD-SCNC: 140 MEQ/L (ref 135–145)
SPECIFIC GRAVITY, URINE: 1.02 (ref 1–1.03)
TOTAL PROTEIN: 7.4 G/DL (ref 6.1–8)
URIC ACID: 6.6 MG/DL (ref 3.7–7)
UROBILINOGEN, URINE: 0.2 EU/DL (ref 0–1)
WBC # BLD: 7.7 THOU/MM3 (ref 4.8–10.8)

## 2020-12-09 ENCOUNTER — TELEPHONE (OUTPATIENT)
Dept: UROLOGY | Age: 40
End: 2020-12-09

## 2020-12-09 NOTE — TELEPHONE ENCOUNTER
[address] [facility] Surgery Preop Check Off    Preop testing- done 2 weeks prior and covid testing 1 week prior to surgery date. Continue to give arrival times and inform patients time is subject to change that day as it gets closer to the day of surgery. PREOP check list      Surgeon Dr. Sujatha Ridley       Patient Name  Bayron Cabrera     1980   MRN   [id number]     DOS   20  Diagnosis/Indication for Surgery  Scrotal lesion   Procedure Excision of scrotal lesion    Allergies   No Known Allergies   UA  20  Normal  Urine Culture    Blood thinners  No     EKG.    CXR- 20   Early left lower lobe infiltrate representing pneumonia.      COVID      Clearance: None  Cardiac-   Medical -       Pre-Admission to surgery- No

## 2020-12-11 LAB — SARS-COV-2: NOT DETECTED

## 2020-12-11 NOTE — PROGRESS NOTES
PAT call attempted, patient unavailable, left message to please call us back at your earliest convenience; [phone]

## 2020-12-14 ENCOUNTER — TELEPHONE (OUTPATIENT)
Dept: UROLOGY | Age: 40
End: 2020-12-14

## 2020-12-14 ENCOUNTER — PREP FOR PROCEDURE (OUTPATIENT)
Dept: UROLOGY | Age: 40
End: 2020-12-14

## 2020-12-14 NOTE — TELEPHONE ENCOUNTER
Attempted to call the patient. After checking HIPPA, voicemail left stating covid test was negative and call for any questions.

## 2020-12-14 NOTE — TELEPHONE ENCOUNTER
[address] [facility] Urology Surgery Preop Check Off    Preop testing- done 2 weeks prior and covid testing 1 week prior to surgery date. Continue to give arrival times and inform patients time is subject to change that day as it gets closer to the day of surgery. PREOP check list      Surgeon Dr. Marcel Conn       Patient Name  Sagrario Yin     1980   MRN   [id number]     DOS   20  Diagnosis/Indication for Surgery  Scrotal lesion   Procedure Excision of scrotal lesion    Allergies   No Known Allergies   UA  20 Normal  Urine Culture  None  Blood thinners  None     EKG.  On arrival  CXR- 20  Early left lower lobe infiltrate representing pneumonia    COVID  20 Not Detected    Clearance:None  Cardiac-   Medical -       Pre-Admission to surgery- No

## 2020-12-18 ENCOUNTER — TELEPHONE (OUTPATIENT)
Dept: UROLOGY | Age: 40
End: 2020-12-18

## 2020-12-18 ENCOUNTER — HOSPITAL ENCOUNTER (OUTPATIENT)
Age: 40
Setting detail: OUTPATIENT SURGERY
Discharge: HOME | End: 2020-12-18
Attending: UROLOGY | Admitting: UROLOGY
Payer: COMMERCIAL

## 2020-12-18 ENCOUNTER — ANESTHESIA (OUTPATIENT)
Dept: OPERATING ROOM | Age: 40
End: 2020-12-18

## 2020-12-18 ENCOUNTER — ANESTHESIA EVENT (OUTPATIENT)
Dept: OPERATING ROOM | Age: 40
End: 2020-12-18

## 2020-12-18 VITALS
BODY MASS INDEX: 49.33 KG/M2 | SYSTOLIC BLOOD PRESSURE: 142 MMHG | WEIGHT: 315 LBS | DIASTOLIC BLOOD PRESSURE: 75 MMHG | HEART RATE: 55 BPM | RESPIRATION RATE: 16 BRPM | TEMPERATURE: 97.6 F | OXYGEN SATURATION: 96 %

## 2020-12-18 PROCEDURE — 6370000000 HC RX 637 (ALT 250 FOR IP): Performed by: UROLOGY

## 2020-12-18 PROCEDURE — 2709999900 HC NON-CHARGEABLE SUPPLY: Performed by: UROLOGY

## 2020-12-18 RX ORDER — IBUPROFEN 200 MG
CAPSULE ORAL 2 TIMES DAILY
Status: DISCONTINUED | OUTPATIENT
Start: 2020-12-18 | End: 2020-12-18 | Stop reason: HOSPADM

## 2020-12-18 RX ORDER — SODIUM CHLORIDE 9 MG/ML
INJECTION, SOLUTION INTRAVENOUS CONTINUOUS
Status: DISCONTINUED | OUTPATIENT
Start: 2020-12-18 | End: 2020-12-18 | Stop reason: HOSPADM

## 2020-12-18 RX ADMIN — BACITRACIN ZINC NEOMYCIN SULFATE POLYMYXIN B SULFATE: 400; 3.5; 5 OINTMENT TOPICAL at 13:02

## 2020-12-18 ASSESSMENT — PAIN SCALES - GENERAL: PAINLEVEL_OUTOF10: 0

## 2020-12-18 NOTE — ANESTHESIA PRE PROCEDURE
Department of Anesthesiology  Preprocedure Note       Name:  Karo Kilpatrick   Age:  38 y.o.  :  1980                                          MRN:  [id number]         Date:  2020      Surgeon: Bhumika Rose):  Bill Duncan MD    Procedure: Procedure(s):  EXCISION OF SCROTAL LESION    Medications prior to admission:   Prior to Admission medications    Medication Sig Start Date End Date Taking? Authorizing Provider   allopurinol (ZYLOPRIM) 300 MG tablet Take 300 mg by mouth 2 times daily    Yes Historical Provider, MD   lisinopril (PRINIVIL;ZESTRIL) 20 MG tablet Take 20 mg by mouth daily. Yes Historical Provider, MD       Current medications:    Current Facility-Administered Medications   Medication Dose Route Frequency Provider Last Rate Last Admin    ceFAZolin (ANCEF) 3 g in dextrose 5 % 100 mL IVPB  3 g Intravenous 30 Min Pre-Op Bill Duncan MD        0.9 % sodium chloride infusion   Intravenous Continuous Bill Duncan MD           Allergies:  No Known Allergies    Problem List:    Patient Active Problem List   Diagnosis Code    Obstructive sleep apnea on CPAP G47.33, Z99.89    Obesity, Class III, BMI 40-49.9 (morbid obesity) ([facility] [address] E66.01    Hypertension I10    Gouty arthritis M10.9    Mass of right testis N50.89       Past Medical History:        Diagnosis Date    Chronic gout 2004    Hypertension     Sleep apnea 2011    has CPAP       Past Surgical History:        Procedure Laterality Date    APPENDECTOMY  2001    TESTICLE REMOVAL Right 2017    Dr. Daley All       Social History:    Social History     Tobacco Use    Smoking status: Former Smoker     Quit date:      Years since quittin.9    Smokeless tobacco: Never Used   Substance Use Topics    Alcohol use:  Yes     Alcohol/week: 0.0 standard drinks     Comment: social                                Counseling given: Not Answered      Vital Signs (Current):   Vitals:    20 1037 BP: (!) 142/75   Pulse: 55   Resp: 16   Temp: 97.6 °F (36.4 °C)   TempSrc: Temporal   SpO2: 96%   Weight: (!) 384 lb 3.2 oz (174.3 kg)                                              BP Readings from Last 3 Encounters:   12/18/20 (!) 142/75   07/22/19 124/70   07/20/18 134/86       NPO Status: Time of last liquid consumption: 0800                        Time of last solid consumption: 2200                        Date of last liquid consumption: 12/18/20                        Date of last solid food consumption: 12/17/20    BMI:   Wt Readings from Last 3 Encounters:   12/18/20 (!) 384 lb 3.2 oz (174.3 kg)   11/18/20 (!) 398 lb (180.5 kg)   09/14/20 (!) 360 lb (163.3 kg)     Body mass index is 49.33 kg/m². CBC:   Lab Results   Component Value Date    WBC 7.7 12/08/2020    RBC 5.57 12/08/2020    RBC 5.56 12/03/2019    HGB 17.2 12/08/2020    HCT 50.5 12/08/2020    MCV 90.7 12/08/2020    RDW 14.4 12/03/2019     12/08/2020       CMP:   Lab Results   Component Value Date     12/08/2020    K 4.1 12/08/2020     12/08/2020    CO2 26 12/08/2020    BUN 17 12/08/2020    CREATININE 0.8 12/08/2020    LABGLOM >90 12/08/2020    GLUCOSE 141 12/08/2020    GLUCOSE 101 12/03/2019    PROT 7.4 12/08/2020    CALCIUM 10.0 12/08/2020    BILITOT 0.5 12/08/2020    ALKPHOS 103 12/08/2020    AST 61 12/08/2020     12/08/2020       POC Tests: No results for input(s): POCGLU, POCNA, POCK, POCCL, POCBUN, POCHEMO, POCHCT in the last 72 hours.     Coags: No results found for: PROTIME, INR, APTT    HCG (If Applicable): No results found for: PREGTESTUR, PREGSERUM, HCG, HCGQUANT     ABGs: No results found for: PHART, PO2ART, OTA8HXX, GOH8AYH, BEART, J0OZKHCD     Type & Screen (If Applicable):  Lab Results   Component Value Date    LABRH POS 03/06/2017       Drug/Infectious Status (If Applicable):  No results found for: HIV, HEPCAB    COVID-19 Screening (If Applicable):   Lab Results   Component Value Date COVID19 Not Detected 12/11/2020         Anesthesia Evaluation  Patient summary reviewed and Nursing notes reviewed no history of anesthetic complications:   Airway: Mallampati: III        Dental:          Pulmonary: breath sounds clear to auscultation  (+) sleep apnea:                             Cardiovascular:    (+) hypertension:,         Rhythm: regular  Rate: normal                    Neuro/Psych:               GI/Hepatic/Renal:   (+) morbid obesity          Endo/Other:                     Abdominal:   (+) obese,     Abdomen: soft. Vascular:                                        Anesthesia Plan      general     ASA 3       Induction: intravenous. MIPS: Postoperative opioids intended and Prophylactic antiemetics administered. Anesthetic plan and risks discussed with patient and spouse. Plan discussed with CRNA.                 [address] [place] [address],    12/18/2020

## 2020-12-18 NOTE — H&P
[facility]  Urology H&P Note     Patient:  Elsie Lam  MRN: [id number]  YOB: 1980    ATTENDING: Emily Branch     CHIEF COMPLAINT:  Excision of scrotal lesion    HISTORY OF PRESENT ILLNESS:   The patient is a 38 y.o. male who presents with an eroded suture after orchiectomy (4-5 years ago). Today in preop the suture is found to easily pull out of skin. Patient's old records, notes and chart reviewed and summarized above. Past Medical History:    Past Medical History:   Diagnosis Date    Chronic gout     Hypertension     Sleep apnea 2011    has CPAP       Past Surgical History:    Past Surgical History:   Procedure Laterality Date    APPENDECTOMY      TESTICLE REMOVAL Right 2017    Dr. Denise Lawson     Previous Urologic Surgery: none  Medications Prior to Admission:    Prior to Admission medications    Medication Sig Start Date End Date Taking? Authorizing Provider   allopurinol (ZYLOPRIM) 300 MG tablet Take 300 mg by mouth 2 times daily    Yes Historical Provider, MD   lisinopril (PRINIVIL;ZESTRIL) 20 MG tablet Take 20 mg by mouth daily. Yes Historical Provider, MD       Allergies:  Patient has no known allergies. Social History:    Social History     Socioeconomic History    Marital status:      Spouse name: Not on file    Number of children: Not on file    Years of education: Not on file    Highest education level: Not on file   Occupational History    Not on file   Social Needs    Financial resource strain: Not on file    Food insecurity     Worry: Not on file     Inability: Not on file    Transportation needs     Medical: Not on file     Non-medical: Not on file   Tobacco Use    Smoking status: Former Smoker     Quit date:      Years since quittin.9    Smokeless tobacco: Never Used   Substance and Sexual Activity    Alcohol use:  Yes     Alcohol/week: 0.0 standard drinks Comment: social    Drug use: No    Sexual activity: Yes   Lifestyle    Physical activity     Days per week: Not on file     Minutes per session: Not on file    Stress: Not on file   Relationships    Social connections     Talks on phone: Not on file     Gets together: Not on file     Attends Sabianism service: Not on file     Active member of club or organization: Not on file     Attends meetings of clubs or organizations: Not on file     Relationship status: Not on file    Intimate partner violence     Fear of current or ex partner: Not on file     Emotionally abused: Not on file     Physically abused: Not on file     Forced sexual activity: Not on file   Other Topics Concern    Not on file   Social History Narrative    Not on file     Family History:    Family History   Problem Relation Age of Onset    High Blood Pressure Mother     Cancer Maternal Grandmother     Stroke Maternal Grandmother     Diabetes Neg Hx     Heart Disease Neg Hx      Previous Urologic Family history: none  REVIEW OF SYSTEMS:  All systems reviewed and negative except for that already noted in the HPI. Physical Exam:      Patient Vitals for the past 24 hrs:   BP Temp Temp src Pulse Resp SpO2 Weight   12/18/20 1037 (!) 142/75 97.6 °F (36.4 °C) Temporal 55 16 96 % (!) 384 lb 3.2 oz (174.3 kg)     Constitutional: Patient in no acute distress; Neuro: alert and oriented to person place and time. Psych: Mood and affect normal.  Skin: Normal  Lungs: Respiratory effort normal  Cardiovascular:  Normal peripheral pulses  Abdomen: Soft, non-tender, non-distended with no CVA, flank pain, hepatosplenomegaly or hernia. Kidneys normal.  Bladder non-tender and not distended. Lymphatics: no palpable lymphadenopathy    Suture found at skin sufface. LABS:   No results for input(s): WBC, HGB, HCT, MCV, PLT in the last 72 hours. No results for input(s): NA, K, CL, CO2, PHOS, BUN, CREATININE in the last 72 hours.     Invalid input(s): CA  No results found for: PSA    Additional Lab/culture results:    Urinalysis: No results for input(s): COLORU, PHUR, LABCAST, WBCUA, RBCUA, MUCUS, TRICHOMONAS, YEAST, BACTERIA, CLARITYU, SPECGRAV, LEUKOCYTESUR, UROBILINOGEN, BILIRUBINUR, BLOODU in the last 72 hours.     Invalid input(s): NITRATE, GLUCOSEUKETONESUAMORPHOUS     -----------------------------------------------------------------  Imaging Results:    Assessment and Plan   Impression:    Patient Active Problem List   Diagnosis    Obstructive sleep apnea on CPAP    Obesity, Class III, BMI 40-49.9 (morbid obesity) ([facility] [address]    Hypertension    Gouty arthritis    Mass of right testis       Plan:   No need for surgery   Follow up as out patient

## 2021-02-03 ENCOUNTER — OFFICE VISIT (OUTPATIENT)
Dept: PULMONOLOGY | Age: 41
End: 2021-02-03
Payer: COMMERCIAL

## 2021-02-03 VITALS
HEIGHT: 74 IN | WEIGHT: 315 LBS | SYSTOLIC BLOOD PRESSURE: 134 MMHG | BODY MASS INDEX: 40.43 KG/M2 | TEMPERATURE: 98.1 F | OXYGEN SATURATION: 96 % | HEART RATE: 83 BPM | DIASTOLIC BLOOD PRESSURE: 82 MMHG

## 2021-02-03 DIAGNOSIS — E66.01 MORBID OBESITY WITH BMI OF 50.0-59.9, ADULT (HCC): ICD-10-CM

## 2021-02-03 DIAGNOSIS — G47.33 OSA ON CPAP: Primary | ICD-10-CM

## 2021-02-03 DIAGNOSIS — Z99.89 OSA ON CPAP: Primary | ICD-10-CM

## 2021-02-03 PROCEDURE — 99213 OFFICE O/P EST LOW 20 MIN: CPT | Performed by: PHYSICIAN ASSISTANT

## 2021-02-03 PROCEDURE — G8484 FLU IMMUNIZE NO ADMIN: HCPCS | Performed by: PHYSICIAN ASSISTANT

## 2021-02-03 PROCEDURE — G8417 CALC BMI ABV UP PARAM F/U: HCPCS | Performed by: PHYSICIAN ASSISTANT

## 2021-02-03 PROCEDURE — G8427 DOCREV CUR MEDS BY ELIG CLIN: HCPCS | Performed by: PHYSICIAN ASSISTANT

## 2021-02-03 PROCEDURE — 1036F TOBACCO NON-USER: CPT | Performed by: PHYSICIAN ASSISTANT

## 2021-02-03 ASSESSMENT — ENCOUNTER SYMPTOMS
EYES NEGATIVE: 1
COUGH: 0
STRIDOR: 0
SHORTNESS OF BREATH: 0
CHEST TIGHTNESS: 0
BACK PAIN: 0
ALLERGIC/IMMUNOLOGIC NEGATIVE: 1
DIARRHEA: 0
NAUSEA: 0
WHEEZING: 0

## 2021-02-03 NOTE — PROGRESS NOTES
[facility]      [facility]         [id number]  2/3/2021   Chief Complaint   Patient presents with    Follow-up     ERICH 1 year follow up with download         Pt of Dr. Michel BECERRA Download:   Original or initial AHI: 89.0     Date of initial study: 2010      Compliant  100%     Noncompliant 0 %     PAP Type CPAPLevel  11   Avg Hrs/Day 7 hr 25 sec  AHI: 0.6   Recorded compliance dates , 2021  to 2021   Machine/Mfg:   [] ResMed    [x] Respironics/Dreamstation   Interface:   [] Nasal    [x] Nasal pillows   [] FFM      Provider:      [x] [facility]     [][employer]     [] [employer]    [] [address]    [] [place]               [] [employer]      [] [employer]    [] [place]:      [] Other    Neck Size: 22  Mallampati Mallampati 4  ESS:  0  SAQLI: 99    Here is a scan of the most recent download:            Presentation:   Ede López presents for sleep medicine follow up for obstructive sleep apnea  Since the last visit, Ede López is doing well with PAP. He is sleeping well. He feels rested. Equipment issues: The pressure is  acceptable, the mask is acceptable     Sleep issues:  Do you feel better? Yes  More rested? Yes   Better concentration? yes    Progress History:   Since last visit any new medical issues? No  New ER or hospitlal visits? No  Any new or changes in medicines? No  Any new sleep medicines? No        Past Medical History:   Diagnosis Date    Chronic gout 2004    Hypertension     Sleep apnea 2011    has CPAP       Past Surgical History:   Procedure Laterality Date    APPENDECTOMY  2001    TESTICLE REMOVAL Right 2017    Dr. Curly Roth       Social History     Tobacco Use    Smoking status: Former Smoker     Quit date:      Years since quittin.1    Smokeless tobacco: Never Used   Substance Use Topics    Alcohol use:  Yes     Alcohol/week: 0.0 standard drinks     Comment: social    Drug use: No       No Known Allergies Current Outpatient Medications   Medication Sig Dispense Refill    allopurinol (ZYLOPRIM) 300 MG tablet Take 300 mg by mouth 2 times daily       lisinopril (PRINIVIL;ZESTRIL) 20 MG tablet Take 20 mg by mouth daily. No current facility-administered medications for this visit. Family History   Problem Relation Age of Onset    High Blood Pressure Mother     Cancer Maternal Grandmother     Stroke Maternal Grandmother     Diabetes Neg Hx     Heart Disease Neg Hx         Review of Systems -   Review of Systems   Constitutional: Negative for activity change, appetite change, chills and fever. HENT: Negative for congestion and postnasal drip. Eyes: Negative. Respiratory: Negative for cough, chest tightness, shortness of breath, wheezing and stridor. Cardiovascular: Negative for chest pain and leg swelling. Gastrointestinal: Negative for diarrhea and nausea. Endocrine: Negative. Genitourinary: Negative. Musculoskeletal: Negative. Negative for arthralgias and back pain. Skin: Negative. Allergic/Immunologic: Negative. Neurological: Negative. Negative for dizziness and light-headedness. Psychiatric/Behavioral: Negative. All other systems reviewed and are negative. Physical Exam:    BMI:  Body mass index is 50.84 kg/m². Wt Readings from Last 3 Encounters:   02/03/21 (!) 396 lb (179.6 kg)   12/18/20 (!) 384 lb 3.2 oz (174.3 kg)   11/18/20 (!) 398 lb (180.5 kg)     Weight stable / unchanged  Vitals: /82 (Site: Left Upper Arm, Position: Sitting, Cuff Size: Large Adult)   Pulse 83   Temp 98.1 °F (36.7 °C) (Temporal)   Ht 6' 2\" (1.88 m)   Wt (!) 396 lb (179.6 kg)   SpO2 96% Comment: Room air at rest  BMI 50.84 kg/m²       Physical Exam  Constitutional:       Appearance: He is well-developed. HENT:      Head: Normocephalic and atraumatic.       Right Ear: External ear normal.      Left Ear: External ear normal.   Eyes: Conjunctiva/sclera: Conjunctivae normal.      Pupils: Pupils are equal, round, and reactive to light. Neck:      Musculoskeletal: Normal range of motion and neck supple. Cardiovascular:      Rate and Rhythm: Normal rate and regular rhythm. Heart sounds: Normal heart sounds. Pulmonary:      Effort: Pulmonary effort is normal.      Breath sounds: Normal breath sounds. Musculoskeletal: Normal range of motion. Skin:     General: Skin is warm and dry. Neurological:      Mental Status: He is alert and oriented to person, place, and time. Psychiatric:         Behavior: Behavior normal.         Thought Content: Thought content normal.         Judgment: Judgment normal.           ASSESSMENT/DIAGNOSIS     Diagnosis Orders   1. ERICH on CPAP     2. Morbid obesity with BMI of 50.0-59.9, adult [facility])            Plan   Do you need any equipment today? Yes update supplies  - Download reviewed and discussed with patient  - He  was advised to continue current positive airway pressure therapy with above described pressure. - He  advised to keep good compliance with current recommended pressure to get optimal results and clinical improvement  - Recommend 7-9 hours of sleep with PAP  - He was advised to call [employer] regarding supplies if needed.   -He call my office for earlier appointment if needed for worsening of sleep symptoms.   - He was instructed on weight loss  - Ginny Chahal was educated about my impression and plan. Patient verbalizesunderstanding.   We will see Love Griffiths back in: 1 year with download    Information added by my medical assistant/LPN was reviewed today         Jarod Duke PA-C, JOSE  2/3/2021       Total time for visit was 20 min

## 2021-03-08 ENCOUNTER — OFFICE VISIT (OUTPATIENT)
Dept: UROLOGY | Age: 41
End: 2021-03-08
Payer: COMMERCIAL

## 2021-03-08 VITALS — BODY MASS INDEX: 40.43 KG/M2 | TEMPERATURE: 97.5 F | WEIGHT: 315 LBS | HEIGHT: 74 IN

## 2021-03-08 DIAGNOSIS — N52.01 ERECTILE DYSFUNCTION DUE TO ARTERIAL INSUFFICIENCY: Primary | ICD-10-CM

## 2021-03-08 PROCEDURE — 99213 OFFICE O/P EST LOW 20 MIN: CPT | Performed by: UROLOGY

## 2021-03-08 PROCEDURE — G8417 CALC BMI ABV UP PARAM F/U: HCPCS | Performed by: UROLOGY

## 2021-03-08 PROCEDURE — 1036F TOBACCO NON-USER: CPT | Performed by: UROLOGY

## 2021-03-08 PROCEDURE — G8427 DOCREV CUR MEDS BY ELIG CLIN: HCPCS | Performed by: UROLOGY

## 2021-03-08 PROCEDURE — G8484 FLU IMMUNIZE NO ADMIN: HCPCS | Performed by: UROLOGY

## 2021-03-08 NOTE — PROGRESS NOTES
Dr. Cornelius English MD  [facility]rology Clinic Consultation / New Patient Visit    Patient:  Cortney Dee  YOB: 1980  Date: 3/8/2021  Consult requested from Lorelei Cobb MD     HISTORY OF PRESENT ILLNESS:   The patient is a 38 y.o. male who presents today for follow-up for the following problem(s): ED, low libido  Overall the problem(s) : are worsening. Associated Symptoms: No dysuria, gross hematuria. Pain Severity:      Today visit:   3/8/21  Suture removal from previous orchiectomy   Trimix - good results, happy     9/14/2020  Today we perform trimix teaching. He is here with his wife. We explain anatomy and risks (including risk of priapism), benefits and alternative procedures are explained, informed consent is obtained, and the patient elects to proceed. At the 3 O'clock position we inject 0.1ml of solution into the corporeal body. Pressure was held, there was no bruising or bleeding. He also has a small scrotal cyst in the upper right scrotum. There is a lesion associated with it. There is purulent drainage, but no obvious signs of infection. 8/10/20   Ange Ledesma presents with us today for follow up for ED. He has tried viagra in the past, and recently was switched to Cialis. He can achieve an erection, but the quality is no sufficient for intercourse. He states he and his wife are getting frusterated. Would like to try another modality. We discuss next option, and he would like to try ICI. Discussed risks and benefits, including risk of priapism. Testosterone levels are reviewed and wnl. Dicussed weight loss. Path: Right orchiectomy  FINAL DIAGNOSIS:   Right testis with spermatic cord, orchiectomy:    Benign epithelial lined cysts consistent with epididymal origin.    Benign testicular parenchyma and unremarkable spermatic cord.    Negative for malignancy.      Summary of old records:   (Patient's old records, notes and chart reviewed and summarized above.)    Last several PSA's:  No results found for: PSA    Last total testosterone:  Lab Results   Component Value Date    TESTOSTERONE 3.84 2019       Urinalysis today:  No results found for this visit on 21. Last BUN and creatinine:  Lab Results   Component Value Date    BUN 17 2020     Lab Results   Component Value Date    CREATININE 0.8 2020       Imaging Reviewed during this Office Visit:   (results were independently reviewed by physician and radiology report verified)    PAST MEDICAL, FAMILY AND SOCIAL HISTORY:  Past Medical History:   Diagnosis Date    Chronic gout     Hypertension     Sleep apnea 2011    has CPAP     Past Surgical History:   Procedure Laterality Date    APPENDECTOMY  2001    TESTICLE REMOVAL Right 2017    Dr. Gorge Hurd     Family History   Problem Relation Age of Onset    High Blood Pressure Mother     Cancer Maternal Grandmother     Stroke Maternal Grandmother     Diabetes Neg Hx     Heart Disease Neg Hx      Outpatient Medications Marked as Taking for the 3/8/21 encounter (Office Visit) with Aristeo Trujillo MD   Medication Sig Dispense Refill    allopurinol (ZYLOPRIM) 300 MG tablet Take 300 mg by mouth 2 times daily       lisinopril (PRINIVIL;ZESTRIL) 20 MG tablet Take 20 mg by mouth daily. Patient has no known allergies.   Social History     Tobacco Use   Smoking Status Former Smoker    Quit date:     Years since quittin.1   Smokeless Tobacco Never Used       Social History     Substance and Sexual Activity   Alcohol Use Yes    Alcohol/week: 0.0 standard drinks    Comment: social       REVIEW OF SYSTEMS:  Constitutional: negative  Eyes: negative  Respiratory: negative  Cardiovascular: negative  Gastrointestinal: negative  Musculoskeletal: negative  Genitourinary: negative  Skin: negative   Neurological: negative  Hematological/Lymphatic: negative  Psychological: negative    Physical Exam:    This a 38 y.o. male   Vitals:    03/08/21 1509   Temp: 97.5 °F (36.4 °C)     Constitutional: Patient in no acute distress   Neuro: alert and oriented to person place and time. Psych: Mood and affect normal.  Head: atraumatic normocephalic  Eyes: EOMi  HEENT: neck supple, trachea midline  Lungs: Respiratory effort normal  Cardiovascular:  Normal peripheral pulses  Abdomen: Soft, non-tender, non-distended, No CVA  Bladder: non-tender and not distended. FROMx4, no cyanosis clubbing edema  Skin: warm and dry      Assessment and Plan      1. Erectile dysfunction due to arterial insufficiency           Plan:      No follow-ups on file. Trimix injected without complication - has controlled ED, discussed risks. Will continue therapy.     Wife present for teaching  Excision of scrotal lesion -

## 2022-03-21 NOTE — PROGRESS NOTES
Dr. Kathe Coto MD  [facility]rology [facility] Consultation / New Patient Visit    Patient:  Hubert Vaughn  YOB: 1980  Date: 8/10/2020  Consult requested from Kathy Linda MD     HISTORY OF PRESENT ILLNESS:   The patient is a 35 y.o. male who presents today for follow-up for the following problem(s): ED, low libido  Overall the problem(s) : are worsening. Associated Symptoms: No dysuria, gross hematuria. Pain Severity:      Today visit:   8/10/20   Qian Scales presents with us today for follow up for ED. He has tried viagra in the past, and recently was switched to Cialis. He can achieve an erection, but the quality is no sufficient for intercourse. He states he and his wife are getting frusterated. Would like to try another modality. We discuss next option, and he would like to try ICI. Discussed risks and benefits, including risk of priapism. Testosterone levels are reviewed and wnl. Dicussed weight loss.     Summary of old records:   (Patient's old records, notes and chart reviewed and summarized above.)    Last several PSA's:  No results found for: PSA    Last total testosterone:  Lab Results   Component Value Date    TESTOSTERONE 3.84 12/03/2019       Urinalysis today:  Results for POC orders placed in visit on 08/10/20   POCT Urinalysis No Micro (Auto)   Result Value Ref Range    Glucose, Ur Negative NEGATIVE mg/dl    Bilirubin Urine Negative     Ketones, Urine Negative NEGATIVE    Specific Gravity, Urine 1.025 1.002 - 1.03    Blood, UA POC Trace-lysed NEGATIVE    pH, Urine 5.50 5.0 - 9.0    Protein, Urine Negative NEGATIVE mg/dl    Urobilinogen, Urine 0.20 0.0 - 1.0 eu/dl    Nitrite, Urine Negative NEGATIVE    Leukocyte Clumps, Urine Negative NEGATIVE    Color, Urine Yellow YELLOW-STR    Character, Urine Clear [id number]         Last BUN and creatinine:  Lab Results   Component Value Date    BUN 14 12/03/2019     Lab Results   Component Value Date    CREATININE 0.95 I have LM for [facility] to fax the results   2019       Imaging Reviewed during this Office Visit:   (results were independently reviewed by physician and radiology report verified)    PAST MEDICAL, FAMILY AND SOCIAL HISTORY:  Past Medical History:   Diagnosis Date    Chronic gout     Hypertension     Sleep apnea 2011    has CPAP     Past Surgical History:   Procedure Laterality Date    APPENDECTOMY  2001    TESTICLE REMOVAL Right 2017    Dr. Witt Remedies     Family History   Problem Relation Age of Onset    High Blood Pressure Mother     Cancer Maternal Grandmother     Stroke Maternal Grandmother     Diabetes Neg Hx     Heart Disease Neg Hx      Outpatient Medications Marked as Taking for the 8/10/20 encounter (Office Visit) with Sheng Chowdary MD   Medication Sig Dispense Refill    tadalafil (CIALIS) 20 MG tablet Take 1 tablet by mouth as needed for Erectile Dysfunction Take at least 1/2 hour prior to sexual intercourse 10 tablet 11    allopurinol (ZYLOPRIM) 300 MG tablet Take 300 mg by mouth 2 times daily       lisinopril (PRINIVIL;ZESTRIL) 20 MG tablet Take 20 mg by mouth daily. Patient has no known allergies. Social History     Tobacco Use   Smoking Status Former Smoker    Last attempt to quit: 2002    Years since quittin.6   Smokeless Tobacco Never Used       Social History     Substance and Sexual Activity   Alcohol Use Yes    Alcohol/week: 0.0 standard drinks    Comment: social       REVIEW OF SYSTEMS:  Constitutional: negative  Eyes: negative  Respiratory: negative  Cardiovascular: negative  Gastrointestinal: negative  Musculoskeletal: negative  Genitourinary: negative  Skin: negative   Neurological: negative  Hematological/Lymphatic: negative  Psychological: negative    Physical Exam:    This a 35 y.o. male   Vitals:    08/10/20 0818   Temp: 94.6 °F (34.8 °C)     Constitutional: Patient in no acute distress   Neuro: alert and oriented to person place and time.     Psych: Mood and affect normal.  Head: atraumatic normocephalic  Eyes: EOMi  HEENT: neck supple, trachea midline  Lungs: Respiratory effort normal  Cardiovascular:  Normal peripheral pulses  Abdomen: Soft, non-tender, non-distended, No CVA  Bladder: non-tender and not distended. FROMx4, no cyanosis clubbing edema  Skin: warm and dry      Assessment and Plan      1. Low libido    2. Scrotal pain    3. Combined arterial insufficiency and corporo-venous occlusive erectile dysfunction           Plan:      No follow-ups on file.   The patient will come back to [address] [facility]

## 2022-04-20 ENCOUNTER — OFFICE VISIT (OUTPATIENT)
Dept: PULMONOLOGY | Age: 42
End: 2022-04-20
Payer: COMMERCIAL

## 2022-04-20 VITALS
TEMPERATURE: 97.8 F | DIASTOLIC BLOOD PRESSURE: 80 MMHG | SYSTOLIC BLOOD PRESSURE: 138 MMHG | HEART RATE: 81 BPM | BODY MASS INDEX: 40.43 KG/M2 | HEIGHT: 74 IN | OXYGEN SATURATION: 98 % | WEIGHT: 315 LBS

## 2022-04-20 DIAGNOSIS — Z99.89 OSA ON CPAP: Primary | ICD-10-CM

## 2022-04-20 DIAGNOSIS — G47.33 OSA ON CPAP: Primary | ICD-10-CM

## 2022-04-20 DIAGNOSIS — E66.01 MORBID OBESITY WITH BMI OF 50.0-59.9, ADULT (HCC): ICD-10-CM

## 2022-04-20 PROCEDURE — 1036F TOBACCO NON-USER: CPT | Performed by: PHYSICIAN ASSISTANT

## 2022-04-20 PROCEDURE — 99213 OFFICE O/P EST LOW 20 MIN: CPT | Performed by: PHYSICIAN ASSISTANT

## 2022-04-20 PROCEDURE — G8419 CALC BMI OUT NRM PARAM NOF/U: HCPCS | Performed by: PHYSICIAN ASSISTANT

## 2022-04-20 PROCEDURE — G8427 DOCREV CUR MEDS BY ELIG CLIN: HCPCS | Performed by: PHYSICIAN ASSISTANT

## 2022-04-20 ASSESSMENT — ENCOUNTER SYMPTOMS
STRIDOR: 0
COUGH: 0
WHEEZING: 0
NAUSEA: 0
CHEST TIGHTNESS: 0
BACK PAIN: 0
DIARRHEA: 0
ALLERGIC/IMMUNOLOGIC NEGATIVE: 1
EYES NEGATIVE: 1
SHORTNESS OF BREATH: 0

## 2022-04-20 NOTE — PROGRESS NOTES
[place] [facility] Pulmonary, Critical Care and Sleep Medicine      Faith Calzada         [id number]  4/20/2022   Chief Complaint   Patient presents with    1 Year Follow Up     ERICH with download         Pt of Dr. Boo Marcelino    PAP Download:   Saw Magdaleno or initial AHI: 89.0     Date of initial study: 5/27/2010      Compliant  100%     Noncompliant 0 %     PAP Type Auto Level  11   Avg Hrs/Day 6 hr 42 min  AHI: 0.4   Recorded compliance dates , 3/19/2022  to 4/17/2022   Machine/Mfg:   [] ResMed    [x] Respironics/Dreamstation   Interface:   [] Nasal    [x] Nasal pillows   [] FFM      Provider:      [x] [facility]     [][employer]     [] [employer]    [] [address]    [] [place]               [] [facility]      [] Adaptive    [] [address]      [] Other    Neck Size: 22  Mallampati Mallampati 4  ESS:  0   SAQLI: 97    Here is a scan of the most recent download:          Presentation:   Ani Morrow presents for sleep medicine follow up for obstructive sleep apnea  Since the last visit, Ani Morrow is doing well with PAP. He is sleeping ok and feels rested. Mask fits well. Equipment issues: The pressure is  acceptable, the mask is acceptable     Sleep issues:  Do you feel better? Yes  More rested? Yes   Better concentration? yes    Progress History:   Since last visit any new medical issues? No  New ER or hospital visits? No  Any new or changes in medicines? No  Any new sleep medicines? No    Review of Systems -   Review of Systems   Constitutional: Negative for activity change, appetite change, chills and fever. HENT: Negative for congestion and postnasal drip. Eyes: Negative. Respiratory: Negative for cough, chest tightness, shortness of breath, wheezing and stridor. Cardiovascular: Negative for chest pain and leg swelling. Gastrointestinal: Negative for diarrhea and nausea. Endocrine: Negative. Genitourinary: Negative. Musculoskeletal: Negative. Negative for arthralgias and back pain. Skin: Negative. Allergic/Immunologic: Negative. Neurological: Negative. Negative for dizziness and light-headedness. Psychiatric/Behavioral: Negative. All other systems reviewed and are negative. Physical Exam:    BMI:  Body mass index is 50.92 kg/m². Wt Readings from Last 3 Encounters:   04/20/22 (!) 396 lb 9.6 oz (179.9 kg)   03/08/21 (!) 336 lb (152.4 kg)   02/03/21 (!) 396 lb (179.6 kg)     Weight stable / unchanged  Vitals: /80 (Site: Left Upper Arm, Position: Sitting, Cuff Size: Large Adult)   Pulse 81   Temp 97.8 °F (36.6 °C) (Temporal)   Ht 6' 2\" (1.88 m)   Wt (!) 396 lb 9.6 oz (179.9 kg)   SpO2 98%   BMI 50.92 kg/m²       Physical Exam  Constitutional:       Appearance: Normal appearance. He is normal weight. HENT:      Head: Normocephalic and atraumatic. Right Ear: External ear normal.      Left Ear: External ear normal.      Nose: Nose normal.   Eyes:      Extraocular Movements: Extraocular movements intact. Conjunctiva/sclera: Conjunctivae normal.      Pupils: Pupils are equal, round, and reactive to light. Pulmonary:      Effort: Pulmonary effort is normal.   Musculoskeletal:      Cervical back: Normal range of motion and neck supple. Neurological:      General: No focal deficit present. Mental Status: He is alert and oriented to person, place, and time. Psychiatric:         Attention and Perception: Attention and perception normal.         Mood and Affect: Mood and affect normal.         Speech: Speech normal.         Behavior: Behavior normal. Behavior is cooperative. Thought Content: Thought content normal.         Cognition and Memory: Cognition normal.         Judgment: Judgment normal.           ASSESSMENT/DIAGNOSIS     Diagnosis Orders   1. ERICH on CPAP     2. Morbid obesity with BMI of 50.0-59.9, adult [facility])            Plan   Do you need any equipment today?  Yes update supplies  - Download reviewed and discussed with patient  - He  was advised to continue current positive airway pressure therapy with above described pressure. - He  advised to keep good compliance with current recommended pressure to get optimal results and clinical improvement  - Recommend 7-9 hours of sleep with PAP  - He was advised to call [employer] company regarding supplies if needed.   -He call my office for earlier appointment if needed for worsening of sleep symptoms.   - He was instructed on weight loss  - Jeremias Shabazz was educated about my impression and plan. Patient verbalizesunderstanding.   We will see Markel Santana back in: 1 year with download    Information added by my medical assistant/LPN was reviewed today         Stanislaw Jacob PA-C, MPAS  4/20/2022

## 2023-02-27 ENCOUNTER — OFFICE VISIT (OUTPATIENT)
Dept: UROLOGY | Age: 43
End: 2023-02-27
Payer: COMMERCIAL

## 2023-02-27 VITALS — RESPIRATION RATE: 18 BRPM | BODY MASS INDEX: 40.43 KG/M2 | WEIGHT: 315 LBS | HEIGHT: 74 IN

## 2023-02-27 DIAGNOSIS — N52.01 ERECTILE DYSFUNCTION DUE TO ARTERIAL INSUFFICIENCY: Primary | ICD-10-CM

## 2023-02-27 PROCEDURE — G8427 DOCREV CUR MEDS BY ELIG CLIN: HCPCS

## 2023-02-27 PROCEDURE — 99213 OFFICE O/P EST LOW 20 MIN: CPT

## 2023-02-27 PROCEDURE — G8484 FLU IMMUNIZE NO ADMIN: HCPCS

## 2023-02-27 PROCEDURE — G8417 CALC BMI ABV UP PARAM F/U: HCPCS

## 2023-02-27 PROCEDURE — 1036F TOBACCO NON-USER: CPT

## 2023-02-27 NOTE — PROGRESS NOTES
[address] [facility] [address]  Dept: [phone]  Loc: [phone]  Visit Date: 2/27/2023    GENNA Walton is a 36 y.o. male that presents to the urology clinic for ED. Patient presents for routine medication check. Has seen Dr. Alicia Peres in the past for treatment of erectile dysfunction. Currently managed on 0.1 mL Trimix injection PRN. Patient notes good efficacy with the medication. Limited use over the past two years (circumstantial). Does admit that he utilized the nearly 3year old medication recently and \"it hurt more than usual.\" Needs refill. No other urologic complaints. Patient happy with urination. No other medications supplied via the office. Last total testosterone:  Lab Results   Component Value Date    TESTOSTERONE 3.84 12/03/2019         Last BUN and creatinine:  Lab Results   Component Value Date    BUN 17 12/08/2020     Lab Results   Component Value Date    CREATININE 0.8 12/08/2020           PAST MEDICAL, FAMILY AND SOCIAL HISTORY UPDATE:  Past Medical History:   Diagnosis Date    Chronic gout 2004    Hypertension     Sleep apnea 2011    has CPAP     Past Surgical History:   Procedure Laterality Date    APPENDECTOMY  2001    TESTICLE REMOVAL Right 03/06/2017    Dr. Eloise Alexis     Family History   Problem Relation Age of Onset    High Blood Pressure Mother     Cancer Maternal Grandmother     Stroke Maternal Grandmother     Diabetes Neg Hx     Heart Disease Neg Hx      Outpatient Medications Marked as Taking for the 2/27/23 encounter (Office Visit) with Marija Fountain PA-C   Medication Sig Dispense Refill    allopurinol (ZYLOPRIM) 300 MG tablet Take 300 mg by mouth 2 times daily       lisinopril (PRINIVIL;ZESTRIL) 20 MG tablet Take 20 mg by mouth daily. Patient has no known allergies.   Social History     Tobacco Use   Smoking Status Former    Types: Cigarettes    Quit date: 2002    Years since quittin.1   Smokeless Tobacco Never       Social History     Substance and Sexual Activity   Alcohol Use Yes    Alcohol/week: 0.0 standard drinks    Comment: social       REVIEW OF SYSTEMS:  Constitutional: negative  Eyes: negative  Respiratory: negative  Cardiovascular: negative  Gastrointestinal: negative  Musculoskeletal: negative  Genitourinary: negative except for what is in HPI  Skin: negative   Neurological: negative  Hematological/Lymphatic: negative  Psychological: negative    Physical Exam:      Vitals:    23 1601   Resp: 18     Patient is a 36 y.o. male in no acute distress and alert and oriented to person, place and time. Pulmonary: Non-labored respiration. Cardiovascular: Normal rate, regular rhythm, normal peripheral pulses. Skin: Warm and dry. Psych: Normal mood and affect. Genitourinary: Bladder non-distended and non-tender. Assessment and Plan   Erectile dysfunction  - Patient with desired response when using Trimix 0.1 mL injection.   - Instructed to properly dispose of old medication. Libby Ellis sending refill orders to specialty pharmacy. - Patient went through further education with myself in the office today. Handout including safety, risks, anatomy, and technique given to the patient. - Follow-up in 1 year with Dr. Sabina Rojas or sooner if needed.        Adela Casanova PA-C  Urology

## 2023-02-28 ENCOUNTER — TELEPHONE (OUTPATIENT)
Dept: UROLOGY | Age: 43
End: 2023-02-28

## 2023-09-22 ENCOUNTER — TELEPHONE (OUTPATIENT)
Dept: PULMONOLOGY | Age: 43
End: 2023-09-22

## 2023-09-22 NOTE — TELEPHONE ENCOUNTER
**9/22/23 LMTC see if pt can come in at 2:40 w/Perla instead 9/25/23 **DL @ [facility] PRIOR TO APPT at 11:45

## 2023-09-25 ENCOUNTER — OFFICE VISIT (OUTPATIENT)
Dept: PULMONOLOGY | Age: 43
End: 2023-09-25
Payer: COMMERCIAL

## 2023-09-25 VITALS
DIASTOLIC BLOOD PRESSURE: 86 MMHG | SYSTOLIC BLOOD PRESSURE: 144 MMHG | BODY MASS INDEX: 40.43 KG/M2 | TEMPERATURE: 98.4 F | OXYGEN SATURATION: 95 % | HEIGHT: 74 IN | WEIGHT: 315 LBS | HEART RATE: 70 BPM

## 2023-09-25 DIAGNOSIS — G47.33 OSA ON CPAP: Primary | ICD-10-CM

## 2023-09-25 DIAGNOSIS — E66.01 MORBID OBESITY WITH BMI OF 45.0-49.9, ADULT (HCC): ICD-10-CM

## 2023-09-25 PROCEDURE — G8417 CALC BMI ABV UP PARAM F/U: HCPCS | Performed by: NURSE PRACTITIONER

## 2023-09-25 PROCEDURE — 1036F TOBACCO NON-USER: CPT | Performed by: NURSE PRACTITIONER

## 2023-09-25 PROCEDURE — G8427 DOCREV CUR MEDS BY ELIG CLIN: HCPCS | Performed by: NURSE PRACTITIONER

## 2023-09-25 PROCEDURE — 99214 OFFICE O/P EST MOD 30 MIN: CPT | Performed by: NURSE PRACTITIONER

## 2023-09-25 PROCEDURE — 3077F SYST BP >= 140 MM HG: CPT | Performed by: NURSE PRACTITIONER

## 2023-09-25 PROCEDURE — 3079F DIAST BP 80-89 MM HG: CPT | Performed by: NURSE PRACTITIONER

## 2023-09-25 ASSESSMENT — ENCOUNTER SYMPTOMS
WHEEZING: 0
RESPIRATORY NEGATIVE: 1
CHEST TIGHTNESS: 0
ALLERGIC/IMMUNOLOGIC NEGATIVE: 1
NAUSEA: 0
GASTROINTESTINAL NEGATIVE: 1
STRIDOR: 0
EYES NEGATIVE: 1
VOMITING: 0
DIARRHEA: 0

## 2023-09-25 NOTE — PROGRESS NOTES
[place] for Pulmonary, Critical Care and Sleep Medicine      Ana Maria Ruano         [id number]  9/25/2023   Chief Complaint   Patient presents with    Follow-up     1 year harriet        Pt of Dr. Booth Bolus     PAP Download:   Ora Mealing or initial AHI: 89.0     Date of initial study: 5/27/2010        Compliant  100%     Noncompliant 0 %     PAP Type cpap Level  11   Avg Hrs/Day 7.5  AHI: 0.4   Recorded compliance dates 08/26/23-09/24/23  Machine/Mfg:   [] ResMed    [x] Respironics/Dreamstation   Interface:   [] Nasal    [x] Nasal pillows   [] FFM      Provider:      [x] SR[facility]ME     []Marcelina     [] Haim    [] Leilani Mariano    [] [place]               [] [facility]      [] [employer]ptive    [] [address] [facility] [address]:      [] Other    Neck Size: 22.25  Mallampati 1  ESS:  0  SAQLI: 98    Here is a scan of the most recent download:            Presentation:   Arsen Hernandez presents for sleep medicine follow up for obstructive sleep apnea  Since the last visit, Arsen Hernandez has been compliant with his PAP therapy and continues to see benefit from its use. Awake and alert today in the office   BMI 49  No sleep medication use at night   No sleep issues or concerns today     Equipment issues: The pressure is  acceptable, the mask is acceptable     Sleep issues:  Do you feel better? Yes  More rested? Yes   Better concentration? yes    Progress History:   Since last visit any new medical issues? No  New ER or hospital visits? No  Any new or changes in medicines? No  Any new sleep medicines? No    Review of Systems -   Review of Systems   Constitutional: Negative. Negative for chills, fever and unexpected weight change. HENT: Negative. Eyes: Negative. Respiratory: Negative. Negative for chest tightness, wheezing and stridor. Cardiovascular:  Negative for chest pain and leg swelling. Gastrointestinal: Negative. Negative for diarrhea, nausea and vomiting. Endocrine: Negative. Genitourinary: Negative. Negative for dysuria.

## 2024-02-12 ENCOUNTER — HOSPITAL ENCOUNTER (OUTPATIENT)
Age: 44
Discharge: HOME | End: 2024-02-12
Payer: COMMERCIAL

## 2024-02-12 ENCOUNTER — HOSPITAL ENCOUNTER (OUTPATIENT)
Dept: GENERAL RADIOLOGY | Age: 44
Discharge: HOME | End: 2024-02-12
Payer: COMMERCIAL

## 2024-02-12 DIAGNOSIS — J20.9 ACUTE BRONCHITIS, UNSPECIFIED ORGANISM: ICD-10-CM

## 2024-02-12 PROCEDURE — 71046 X-RAY EXAM CHEST 2 VIEWS: CPT

## 2024-08-12 ENCOUNTER — OFFICE VISIT (OUTPATIENT)
Dept: UROLOGY | Age: 44
End: 2024-08-12
Payer: COMMERCIAL

## 2024-08-12 VITALS — WEIGHT: 315 LBS | HEIGHT: 74 IN | RESPIRATION RATE: 16 BRPM | BODY MASS INDEX: 40.43 KG/M2

## 2024-08-12 DIAGNOSIS — N52.01 ERECTILE DYSFUNCTION DUE TO ARTERIAL INSUFFICIENCY: Primary | ICD-10-CM

## 2024-08-12 PROCEDURE — 1036F TOBACCO NON-USER: CPT | Performed by: UROLOGY

## 2024-08-12 PROCEDURE — 99213 OFFICE O/P EST LOW 20 MIN: CPT | Performed by: UROLOGY

## 2024-08-12 PROCEDURE — G8417 CALC BMI ABV UP PARAM F/U: HCPCS | Performed by: UROLOGY

## 2024-08-12 PROCEDURE — G8427 DOCREV CUR MEDS BY ELIG CLIN: HCPCS | Performed by: UROLOGY

## 2024-08-12 NOTE — PROGRESS NOTES
Dr. Pancho Pfeiffer Jr, MD MD  [facility] Consultation / FU Visit    Patient:  Ceasar Olivo  YOB: 1980  Date: 8/12/2024  Consult requested from Vangie Valladares MD     HISTORY OF PRESENT ILLNESS:   The patient is a 43 y.o. male who presents today for follow-up for the following problem(s): ED, low libido  Overall the problem(s) : are worsening.  Associated Symptoms: No dysuria, gross hematuria.   Pain Severity:      Today visit:   8/12/24   Suture removal from previous orchiectomy   Trimix - good results, happy   Patient has decreased libido and decreased energy  ERICH - treated    9/14/2020  Today we perform trimix teaching.  He is here with his wife.    We explain anatomy and risks (including risk of priapism), benefits and alternative procedures are explained, informed consent is obtained, and the patient elects to proceed.  At the 3 O'clock position we inject 0.1ml of solution into the corporeal body.  Pressure was held, there was no bruising or bleeding.  He also has a small scrotal cyst in the upper right scrotum.  There is a lesion associated with it.  There is purulent drainage, but no obvious signs of infection.     8/10/20   Ceasar presents with us today for follow up for ED.  He has tried viagra in the past, and recently was switched to Cialis.  He can achieve an erection, but the quality is no sufficient for intercourse.  He states he and his wife are getting frusterated.   Would like to try another modality.  We discuss next option, and he would like to try ICI.  Discussed risks and benefits, including risk of priapism.    Testosterone levels are reviewed and wnl.   Dicussed weight loss.    Path: Right orchiectomy  FINAL DIAGNOSIS:   Right testis with spermatic cord, orchiectomy:    Benign epithelial lined cysts consistent with epididymal origin.    Benign testicular parenchyma and unremarkable spermatic cord.    Negative for malignancy.     Summary of old records:

## 2024-09-23 ENCOUNTER — OFFICE VISIT (OUTPATIENT)
Dept: PULMONOLOGY | Age: 44
End: 2024-09-23
Payer: COMMERCIAL

## 2024-09-23 VITALS
BODY MASS INDEX: 39.99 KG/M2 | WEIGHT: 311.6 LBS | HEART RATE: 65 BPM | TEMPERATURE: 98.7 F | OXYGEN SATURATION: 98 % | SYSTOLIC BLOOD PRESSURE: 118 MMHG | HEIGHT: 74 IN | DIASTOLIC BLOOD PRESSURE: 70 MMHG

## 2024-09-23 DIAGNOSIS — G47.33 OSA ON CPAP: Primary | ICD-10-CM

## 2024-09-23 PROCEDURE — G8417 CALC BMI ABV UP PARAM F/U: HCPCS | Performed by: NURSE PRACTITIONER

## 2024-09-23 PROCEDURE — G8427 DOCREV CUR MEDS BY ELIG CLIN: HCPCS | Performed by: NURSE PRACTITIONER

## 2024-09-23 PROCEDURE — 99214 OFFICE O/P EST MOD 30 MIN: CPT | Performed by: NURSE PRACTITIONER

## 2024-09-23 PROCEDURE — 3078F DIAST BP <80 MM HG: CPT | Performed by: NURSE PRACTITIONER

## 2024-09-23 PROCEDURE — 3074F SYST BP LT 130 MM HG: CPT | Performed by: NURSE PRACTITIONER

## 2024-09-23 PROCEDURE — 1036F TOBACCO NON-USER: CPT | Performed by: NURSE PRACTITIONER

## 2024-09-23 ASSESSMENT — ENCOUNTER SYMPTOMS
NAUSEA: 0
DIARRHEA: 0
WHEEZING: 0
CHEST TIGHTNESS: 0
EYES NEGATIVE: 1
VOMITING: 0
ALLERGIC/IMMUNOLOGIC NEGATIVE: 1
STRIDOR: 0
GASTROINTESTINAL NEGATIVE: 1
RESPIRATORY NEGATIVE: 1

## 2025-02-03 ENCOUNTER — TELEPHONE (OUTPATIENT)
Dept: PULMONOLOGY | Age: 45
End: 2025-02-03

## 2025-02-03 DIAGNOSIS — G47.33 OSA ON CPAP: Primary | ICD-10-CM

## 2025-02-03 NOTE — TELEPHONE ENCOUNTER
Patient called in and stated that he went to [facility] to have his machine checked and they told him to contact us for an order for a new machine. He is not able to wear it as it is not turning on anymore. I spoke with Payton at [facility] who stated that Bonny took the machine and Ty inspected it and said he is going to need an order for a new machine. Please advise, thank you !           Patient scheuled for a sooner appt as well with Luis Miguel as he is requesting a repeat sleep study due to weight loss.

## 2025-02-04 NOTE — TELEPHONE ENCOUNTER
Covering due to previous provider being unavailable reviewed last office note. Order placed for new CPAP machine give follow-up in sleep clinic in 4-8 weeks for follow-up after obtaining new machine

## 2025-02-06 NOTE — TELEPHONE ENCOUNTER
Faxed order to [facility] , they are closed . I will call them tomorrow to check in on this. I called patient and he said they told him they would not be able to fill order and gave him a loaner instead.

## 2025-02-14 NOTE — TELEPHONE ENCOUNTER
Renato,    I spoke with Bonny. They provided loaner as they are holding off on running order for a new machine through insurance as patient is hoping to be able to discontinue use of cpap since he has lost weight.      I called and spoke with patient this morning. Would you be willing to place order for repeat sleep study due to weight loss of 117lbs? We could hopefully have this completed for patient prior to him coming back in with Luis Miguel on 4.2.25

## 2025-02-14 NOTE — TELEPHONE ENCOUNTER
Please let patient know we can get him scheduled for a sleep clinic appointment to review symptoms and document his current weight.  Insurance will require these prior to approving repeat sleep study.

## 2025-04-01 NOTE — PROGRESS NOTES
[place] [facility] Pulmonary, Critical Care and Sleep Medicine      Ceasar Olivo         [id number]  4/2/2025   Chief Complaint   Patient presents with    Follow-up     HARRIET 6 month f/u with [facility] download        Patient of Dr. Oconnor     Assessment/Plan      Diagnosis Orders   1. HARRIET on CPAP  Misc. Devices (CPAP MACHINE) MISC    DME Order for CPAP as OP      2. Obesity (BMI 30-39.9)             -Change pressure to 9 related to side effects including sensation of ear popping following significant weight loss over last 1 year -pressure changed in office today  -New machine order placed at 9 cm H2O.  Will hopefully get a new machine to replace his loaner machine    -Yearly supply order placed? No  -Download was personally reviewed and discussed with patient at length.  -The symptoms of HARIRET have improved. The patient is benefiting from therapy and should continue use of PAP device.  -Patient's symptoms and AHI are controlled with current settings of 11 cmH2O. change current pressure settings as noted.  -Advised to continue current positive airway pressure therapy with above described pressure.   -Advised to keep good compliance with current recommended pressure to get optimal results and clinical improvement  -Recommend 7-9 hours of sleep with PAP  -Instructed to call DME company regarding supplies if needed.   -Patient to call my office for earlier appointment if needed for worsening of sleep symptoms.   -Patient is not to drive if feeling sleepy  -Counseled patient on weight loss    Educated about my impression and plan. Patient verbalizes understanding.      Return in about 3 months (around 7/2/2025) for harriet replacement machine, pressure change. with download.      Subjective   Presentation:   Ceasar presents for sleep medicine follow up for obstructive sleep apnea.  Since the last visit, Ceasar's machine has stopped working and he is currently using a loaner machine. He is doing well. He has had the loaner

## 2025-04-02 ENCOUNTER — OFFICE VISIT (OUTPATIENT)
Age: 45
End: 2025-04-02
Payer: COMMERCIAL

## 2025-04-02 VITALS
WEIGHT: 277.7 LBS | OXYGEN SATURATION: 95 % | BODY MASS INDEX: 35.64 KG/M2 | HEART RATE: 91 BPM | DIASTOLIC BLOOD PRESSURE: 70 MMHG | HEIGHT: 74 IN | SYSTOLIC BLOOD PRESSURE: 118 MMHG | TEMPERATURE: 99.1 F

## 2025-04-02 DIAGNOSIS — E66.9 OBESITY (BMI 30-39.9): ICD-10-CM

## 2025-04-02 DIAGNOSIS — G47.33 OSA ON CPAP: Primary | ICD-10-CM

## 2025-04-02 PROCEDURE — G8417 CALC BMI ABV UP PARAM F/U: HCPCS

## 2025-04-02 PROCEDURE — 3074F SYST BP LT 130 MM HG: CPT

## 2025-04-02 PROCEDURE — 99214 OFFICE O/P EST MOD 30 MIN: CPT

## 2025-04-02 PROCEDURE — 1036F TOBACCO NON-USER: CPT

## 2025-04-02 PROCEDURE — G8427 DOCREV CUR MEDS BY ELIG CLIN: HCPCS

## 2025-04-02 PROCEDURE — 3078F DIAST BP <80 MM HG: CPT

## 2025-04-02 ASSESSMENT — ENCOUNTER SYMPTOMS
RHINORRHEA: 0
COUGH: 0
SHORTNESS OF BREATH: 0
WHEEZING: 0
SORE THROAT: 0

## 2025-04-21 ENCOUNTER — TELEPHONE (OUTPATIENT)
Age: 45
End: 2025-04-21

## 2025-05-21 ENCOUNTER — TELEPHONE (OUTPATIENT)
Age: 45
End: 2025-05-21

## 2025-05-21 NOTE — TELEPHONE ENCOUNTER
Called patient to get him rescheduled and left Vm stating that lisa will not be here that day so we need to get him on with another provider. Also sent him a message on [employer].

## 2025-05-28 ENCOUNTER — TELEPHONE (OUTPATIENT)
Dept: INFECTIOUS DISEASES | Age: 45
End: 2025-05-28

## 2025-05-28 NOTE — TELEPHONE ENCOUNTER
Called patient today for the 3rd time and let him know in the Vm that we will wait for him to reschedule and Isent him a message on [employer]

## 2025-07-09 ENCOUNTER — OFFICE VISIT (OUTPATIENT)
Age: 45
End: 2025-07-09
Payer: COMMERCIAL

## 2025-07-09 VITALS
BODY MASS INDEX: 37.01 KG/M2 | WEIGHT: 288.4 LBS | HEIGHT: 74 IN | SYSTOLIC BLOOD PRESSURE: 124 MMHG | HEART RATE: 73 BPM | OXYGEN SATURATION: 98 % | DIASTOLIC BLOOD PRESSURE: 76 MMHG | TEMPERATURE: 98.2 F

## 2025-07-09 DIAGNOSIS — E66.9 OBESITY (BMI 30-39.9): ICD-10-CM

## 2025-07-09 DIAGNOSIS — G47.33 OSA ON CPAP: Primary | ICD-10-CM

## 2025-07-09 PROCEDURE — G8427 DOCREV CUR MEDS BY ELIG CLIN: HCPCS

## 2025-07-09 PROCEDURE — 3078F DIAST BP <80 MM HG: CPT

## 2025-07-09 PROCEDURE — 99214 OFFICE O/P EST MOD 30 MIN: CPT

## 2025-07-09 PROCEDURE — 3074F SYST BP LT 130 MM HG: CPT

## 2025-07-09 PROCEDURE — 1036F TOBACCO NON-USER: CPT

## 2025-07-09 PROCEDURE — G8417 CALC BMI ABV UP PARAM F/U: HCPCS

## 2025-07-09 ASSESSMENT — ENCOUNTER SYMPTOMS
SHORTNESS OF BREATH: 0
WHEEZING: 0
COUGH: 0
RHINORRHEA: 0
SORE THROAT: 0

## 2025-08-05 DIAGNOSIS — N52.01 ERECTILE DYSFUNCTION DUE TO ARTERIAL INSUFFICIENCY: Primary | ICD-10-CM

## (undated) DEVICE — MARKER,SKIN,WI/RULER AND LABELS: Brand: MEDLINE

## (undated) DEVICE — INTENDED FOR TISSUE SEPARATION, AND OTHER PROCEDURES THAT REQUIRE A SHARP SURGICAL BLADE TO PUNCTURE OR CUT.: Brand: BARD-PARKER ® CARBON RIB-BACK BLADES

## (undated) DEVICE — PENROSE DRAIN 18 X .5" SILICONE: Brand: MEDLINE

## (undated) DEVICE — SHEET, T, LAPAROTOMY, STERILE: Brand: MEDLINE

## (undated) DEVICE — PADDING CAST W6INXL4YD COT LO LINTING WYTEX

## (undated) DEVICE — DRAPE,UNDERBUTTOCKS,PCH,STERILE: Brand: MEDLINE

## (undated) DEVICE — ADHESIVE SKIN CLSR 0.7ML TOP DERMBND ADV

## (undated) DEVICE — CYSTO PACK: Brand: MEDLINE INDUSTRIES, INC.